# Patient Record
Sex: FEMALE | Race: BLACK OR AFRICAN AMERICAN | NOT HISPANIC OR LATINO | Employment: OTHER | URBAN - METROPOLITAN AREA
[De-identification: names, ages, dates, MRNs, and addresses within clinical notes are randomized per-mention and may not be internally consistent; named-entity substitution may affect disease eponyms.]

---

## 2017-01-05 ENCOUNTER — PATIENT OUTREACH (OUTPATIENT)
Dept: ADMINISTRATIVE | Facility: HOSPITAL | Age: 82
End: 2017-01-05

## 2017-01-05 NOTE — LETTER
January 5, 2017    Kiki Arechiga  [address] [place] [address]             [facility]  [address]  [facility] [address]  Phone: [phone] Dear MsAnnabella Arechiga:    [facility] is committed to your overall health.  To help you get the most out of each of your visits, we will review your information to make sure you are up to date on all of your recommended tests and/or procedures.      Sara Mcneal MD has found that you may be due for   Health Maintenance Due   Topic    Colonoscopy     Influenza Vaccine     Pneumococcal Vaccine    Lipid Panel    If you have had any of the above done at another facility, please bring the records or information with you so that your record at [facility] will be complete.    If you are currently taking medication, please bring it with you to your appointment for review.    We will be happy to assist you with scheduling any necessary appointments or you may contact the [facility] appointment desk at [phone] to schedule at your convenience.     Thank you for choosing [facility] for your healthcare needs,      Eliana PERALES LPN Care Coordinator  Care Coordination Department  [facility]

## 2017-01-09 RX ORDER — LEVOTHYROXINE SODIUM 50 UG/1
TABLET ORAL
Qty: 90 TABLET | Refills: 0 | Status: SHIPPED | OUTPATIENT
Start: 2017-01-09 | End: 2017-04-17 | Stop reason: SDUPTHER

## 2017-02-08 RX ORDER — TRIAMCINOLONE ACETONIDE 1 MG/G
CREAM TOPICAL 3 TIMES DAILY
Qty: 1 TUBE | Refills: 0 | Status: SHIPPED | OUTPATIENT
Start: 2017-02-08 | End: 2017-03-08 | Stop reason: SDUPTHER

## 2017-03-08 RX ORDER — TRIAMCINOLONE ACETONIDE 1 MG/G
CREAM TOPICAL
Qty: 15 G | Refills: 0 | Status: SHIPPED | OUTPATIENT
Start: 2017-03-08 | End: 2017-04-06

## 2017-04-06 ENCOUNTER — OFFICE VISIT (OUTPATIENT)
Dept: FAMILY MEDICINE | Facility: CLINIC | Age: 82
End: 2017-04-06
Payer: MEDICARE

## 2017-04-06 ENCOUNTER — LAB VISIT (OUTPATIENT)
Dept: LAB | Facility: HOSPITAL | Age: 82
End: 2017-04-06
Attending: FAMILY MEDICINE
Payer: MEDICARE

## 2017-04-06 ENCOUNTER — APPOINTMENT (OUTPATIENT)
Dept: CARDIOLOGY | Facility: CLINIC | Age: 82
End: 2017-04-06
Payer: MEDICARE

## 2017-04-06 VITALS
SYSTOLIC BLOOD PRESSURE: 118 MMHG | WEIGHT: 122.81 LBS | HEART RATE: 62 BPM | RESPIRATION RATE: 16 BRPM | DIASTOLIC BLOOD PRESSURE: 70 MMHG | BODY MASS INDEX: 23.59 KG/M2 | OXYGEN SATURATION: 99 % | TEMPERATURE: 97 F

## 2017-04-06 DIAGNOSIS — I10 ESSENTIAL HYPERTENSION: ICD-10-CM

## 2017-04-06 DIAGNOSIS — E03.9 ACQUIRED HYPOTHYROIDISM: ICD-10-CM

## 2017-04-06 DIAGNOSIS — B37.2 CANDIDAL INTERTRIGO: ICD-10-CM

## 2017-04-06 DIAGNOSIS — E78.2 MIXED HYPERLIPIDEMIA: ICD-10-CM

## 2017-04-06 DIAGNOSIS — I45.2 BIFASCICULAR BLOCK: ICD-10-CM

## 2017-04-06 DIAGNOSIS — F03.90 DEMENTIA WITHOUT BEHAVIORAL DISTURBANCE, UNSPECIFIED DEMENTIA TYPE: ICD-10-CM

## 2017-04-06 DIAGNOSIS — Z00.00 PREVENTATIVE HEALTH CARE: Primary | ICD-10-CM

## 2017-04-06 DIAGNOSIS — N18.30 CKD (CHRONIC KIDNEY DISEASE), STAGE III: ICD-10-CM

## 2017-04-06 DIAGNOSIS — R26.81 UNSTEADY GAIT: ICD-10-CM

## 2017-04-06 LAB
ALBUMIN SERPL BCP-MCNC: 3.8 G/DL
ALP SERPL-CCNC: 134 U/L
ALT SERPL W/O P-5'-P-CCNC: 25 U/L
ANION GAP SERPL CALC-SCNC: 13 MMOL/L
AST SERPL-CCNC: 32 U/L
BASOPHILS # BLD AUTO: 0.04 K/UL
BASOPHILS NFR BLD: 0.3 %
BILIRUB SERPL-MCNC: 0.6 MG/DL
BUN SERPL-MCNC: 19 MG/DL
CALCIUM SERPL-MCNC: 10.3 MG/DL
CHLORIDE SERPL-SCNC: 101 MMOL/L
CHOLEST/HDLC SERPL: 2.8 {RATIO}
CO2 SERPL-SCNC: 28 MMOL/L
CREAT SERPL-MCNC: 1.1 MG/DL
DIFFERENTIAL METHOD: ABNORMAL
EOSINOPHIL # BLD AUTO: 0.3 K/UL
EOSINOPHIL NFR BLD: 2.4 %
ERYTHROCYTE [DISTWIDTH] IN BLOOD BY AUTOMATED COUNT: 14.6 %
EST. GFR  (AFRICAN AMERICAN): 52.5 ML/MIN/1.73 M^2
EST. GFR  (NON AFRICAN AMERICAN): 45.6 ML/MIN/1.73 M^2
GLUCOSE SERPL-MCNC: 89 MG/DL
HCT VFR BLD AUTO: 39.5 %
HDL/CHOLESTEROL RATIO: 35.6 %
HDLC SERPL-MCNC: 135 MG/DL
HDLC SERPL-MCNC: 48 MG/DL
HGB BLD-MCNC: 13.4 G/DL
LDLC SERPL CALC-MCNC: 67.6 MG/DL
LYMPHOCYTES # BLD AUTO: 2 K/UL
LYMPHOCYTES NFR BLD: 17.2 %
MCH RBC QN AUTO: 29.1 PG
MCHC RBC AUTO-ENTMCNC: 33.9 %
MCV RBC AUTO: 86 FL
MONOCYTES # BLD AUTO: 0.8 K/UL
MONOCYTES NFR BLD: 6.6 %
NEUTROPHILS # BLD AUTO: 8.5 K/UL
NEUTROPHILS NFR BLD: 73.3 %
NONHDLC SERPL-MCNC: 87 MG/DL
PLATELET # BLD AUTO: 191 K/UL
PMV BLD AUTO: 12.9 FL
POTASSIUM SERPL-SCNC: 3.7 MMOL/L
PROT SERPL-MCNC: 7.8 G/DL
RBC # BLD AUTO: 4.61 M/UL
SODIUM SERPL-SCNC: 142 MMOL/L
TRIGL SERPL-MCNC: 97 MG/DL
TSH SERPL DL<=0.005 MIU/L-ACNC: 1.47 UIU/ML
WBC # BLD AUTO: 11.62 K/UL

## 2017-04-06 PROCEDURE — 99397 PER PM REEVAL EST PAT 65+ YR: CPT | Mod: 25,S$GLB,, | Performed by: FAMILY MEDICINE

## 2017-04-06 PROCEDURE — 93005 ELECTROCARDIOGRAM TRACING: CPT | Mod: S$GLB,,, | Performed by: FAMILY MEDICINE

## 2017-04-06 PROCEDURE — 36415 COLL VENOUS BLD VENIPUNCTURE: CPT | Mod: PO

## 2017-04-06 PROCEDURE — 80061 LIPID PANEL: CPT

## 2017-04-06 PROCEDURE — 84443 ASSAY THYROID STIM HORMONE: CPT

## 2017-04-06 PROCEDURE — 99999 PR PBB SHADOW E&M-EST. PATIENT-LVL III: CPT | Mod: PBBFAC,,, | Performed by: FAMILY MEDICINE

## 2017-04-06 PROCEDURE — 93010 ELECTROCARDIOGRAM REPORT: CPT | Mod: S$GLB,,, | Performed by: NUCLEAR MEDICINE

## 2017-04-06 PROCEDURE — 85025 COMPLETE CBC W/AUTO DIFF WBC: CPT

## 2017-04-06 PROCEDURE — 80053 COMPREHEN METABOLIC PANEL: CPT

## 2017-04-06 RX ORDER — CLOTRIMAZOLE AND BETAMETHASONE DIPROPIONATE 10; .64 MG/G; MG/G
CREAM TOPICAL 2 TIMES DAILY
Qty: 1 TUBE | Refills: 0 | Status: SHIPPED | OUTPATIENT
Start: 2017-04-06 | End: 2017-06-21 | Stop reason: SDUPTHER

## 2017-04-06 NOTE — MR AVS SNAPSHOT
[facility]  [address]  [place] [address]  Phone: [phone]                  Kiki Arechiga   2017 1:30 PM   Office Visit    Description:  Female : 1931   Provider:  Sara Mcneal MD   Department:  [facility]           Reason for Visit     Annual Exam     Foot Swelling           Diagnoses this Visit        Comments    Preventative health care    -  Primary     Acquired hypothyroidism         Dementia without behavioral disturbance, unspecified dementia type         Essential hypertension         Mixed hyperlipidemia         CKD (chronic kidney disease), stage III         Bifascicular block         Candidal intertrigo         Unsteady gait                To Do List           Future Appointments        Provider Department Dept Phone    2017 2:20 PM LABORATORY, [address] [facility] [phone]      Goals (5 Years of Data)     None      [facility]     [facility] Nurse Care Line -  Assistance  Unless otherwise directed by your provider, please contact [facility], our nurse care line that is available for  assistance.     Registered nurses in the [facility] provide: appointment scheduling, clinical advisement, health education, and other advisory services.  Call: [phone] (toll free)               Medications           Message regarding Medications     Verify the changes and/or additions to your medication regime listed below are the same as discussed with your clinician today.  If any of these changes or additions are incorrect, please notify your healthcare provider.        STOP taking these medications     meloxicam (MOBIC) 7.5 MG tablet Take 1 tablet (7.5 mg total) by mouth once daily.    oxybutynin (DITROPAN) 5 MG Tab Take 1 tablet (5 mg total) by mouth 3 (three) times daily as needed. For urinary frequency           Verify that the below list of medications is  an accurate representation of the medications you are currently taking.  If none reported, the list may be blank. If incorrect, please contact your healthcare provider. Carry this list with you in case of emergency.           Current Medications     atorvastatin (LIPITOR) 10 MG tablet take 1 tablet by mouth every evening    CALCIUM CARBONATE (CALCIUM 300 ORAL) Take by mouth. 1 Tablet By mouth Every day    donepezil (ARICEPT) 10 MG tablet take 1 tablet by mouth every evening    hydrochlorothiazide (HYDRODIURIL) 25 MG tablet take 1 tablet by mouth every morning    levothyroxine (SYNTHROID) 50 MCG tablet take 1 tablet by mouth once daily before BREAKFAST    triamcinolone acetonide 0.1% (KENALOG) 0.1 % cream apply to affected area three times a day           Clinical Reference Information           Your Vitals Were     BP Pulse Temp Resp Weight SpO2    118/70 62 96.5 °F (35.8 °C) (Tympanic) 16 55.7 kg (122 lb 12.7 oz) 99%    BMI                23.59 kg/m2          Blood Pressure          Most Recent Value    BP  118/70      Allergies as of 4/6/2017     No Known Allergies      Immunizations Administered on Date of Encounter - 4/6/2017     None      Orders Placed During Today's Visit      Normal Orders This Visit    Ambulatory Consult to Home Health     EKG 12-lead     Future Labs/Procedures Expected by Expires    CBC auto differential  4/6/2017 6/5/2018    Comprehensive metabolic panel  4/6/2017 6/5/2018    Lipid panel  4/6/2017 6/5/2018    TSH  4/6/2017 6/5/2018      Language Assistance Services     ATTENTION: Language assistance services are available, free of charge. Please call [phone].      ATENCIÓN: Si habla español, tiene a arceo disposición servicios gratuitos de asistencia lingüística. Llame al [phone].     CHÚ Ý: N?u b?n nói Ti?ng Vi?t, có các d?ch v? h? tr? ngôn ng? mi?n phí dành cho b?n. G?i s? [phone].         [facility] complies with applicable Federal civil rights laws  and does not discriminate on the basis of race, color, national origin, age, disability, or sex.

## 2017-04-06 NOTE — PROGRESS NOTES
CHIEF COMPLAINT: This is an 86 year old female here for preventive health exam.    SUBJECTIVE: Patient is brought in today by her sister and sister-in-law.  The patient lives at home with her daughter, who is wheelchair-bound due to his stroke.  Caregivers for the daughter usually provide meals.  The patient is not physically active.  She sits all day and does not get up and ambulate, although she has a walker at home.  She has very little social activity.   Patient has urinary incontinence.  She is able to dress herself with help and feeds herself at home.  Her family members are concerned about her and request home health.  Patient has an intermittent tremor in her hands.    Eye exam July 2014. Mammogram November 2016. Bone DEXA scan October 2015, due again in October 2017. Colonoscopy June 2009. TD booster November 2012. Pneumovax June 2005.  Prevnar October 2015.     ROS:  GENERAL: Patient denies fever, chills, night sweats. Patient denies weight gain. Patient denies anorexia, fatigue, weakness or swollen glands.  SKIN: Patient denies rash or hair loss.  HEENT: Patient denies sore throat, ear pain, hearing loss, nasal congestion, or runny nose. Patient denies visual disturbance, eye irritation or discharge.  LUNGS: Patient denies cough, wheeze or hemoptysis.  CARDIOVASCULAR: Patient denies chest pain, shortness of breath, palpitations, syncope or lower extremity edema.  GI: Patient denies abdominal pain, nausea, vomiting, diarrhea, constipation, blood in stool or melena.  GENITOURINARY: Patient denies pelvic pain, vaginal discharge, itch or odor. Patient denies irregular vaginal bleeding. Patient denies dysuria, frequency, hematuria, nocturia, urgency or incontinence.  BREASTS: Patient denies breast pain, mass or nipple discharge.  MUSCULOSKELETAL: Patient denies joint swelling, redness or warmth.  NEUROLOGIC: Patient denies headache, vertigo, paresthesias, weakness in limb, dysarthria, dysphagia or abnormality  of gait.  PSYCHIATRIC: Patient denies anxiety, depression, or memory loss.     OBJECTIVE:   GENERAL: Well-developed well-nourished, elderly, black female alert and oriented x3, in no acute distress. Mild to moderate cognitive impairment. Weight loss of 5 pounds in the last year.  SKIN: Confluent, maculopapular eruption underneath breasts.   HEENT: Eyes: Clear conjunctivae. No scleral icterus. Pupils equal reactive to light and accommodation. Ears: Clear TMs. Clear canals. Nose: Without congestion. Pharynx: Without injection or exudates.   NECK: Supple, normal range of motion. No masses, lymphadenopathy or enlarged thyroid. No JVD. Carotids 2+ and equal. No bruits.   LUNGS: Clear to auscultation. Normal respiratory effort.   CARDIOVASCULAR: Regular rhythm with frequent irregular beats, normal S1, S2 without murmur, gallop or rub.   BACK: No CVA or spinal tenderness.   BREASTS: No masses, tenderness or nipple discharge.   ABDOMEN: Normal appearance. Active bowel sounds. Soft, nontender without mass or organomegaly. No rebound or guarding.   EXTREMITIES: Without cyanosis, clubbing or edema. Distal pulses 2+ and equal. Normal range of motion in all extremities. No joint effusion, erythema or warmth.   NEUROLOGIC: Cranial nerves II through XII without deficit. Motor strength equal bilaterally. Sensation normal to touch. Deep tendon reflexes 2+ and equal. Gait unsteady without support.  Mild intention tremor. Negative cerebellar signs.   PELVIC: External: Without lesions or inflammation. Vaginal: Atrophic changes, cuff intact. Bimanual: Non-tender, without masses. Rectovaginal: Confirms, heme-negative stool x2.    EKG: Normal sinus rhythm.  Right bundle félix block.  Left anterior fascicular block.    ASSESSMENT:  1. Preventative health care    2. Acquired hypothyroidism    3. Dementia without behavioral disturbance, unspecified dementia type    4. Essential hypertension    5. Mixed hyperlipidemia    6. CKD (chronic  kidney disease), stage III    7. Bifascicular block    8. Candidal intertrigo    9. Unsteady gait      PLAN:   1.  Exercise regularly.  2.  Age-appropriate counseling.  3.  Fasting lab.  4.  Lotrisone cream twice daily to rash.  5.  Home health consult.  6.  Needs bone DEXA scan in October 2017.

## 2017-04-13 ENCOUNTER — TELEPHONE (OUTPATIENT)
Dept: FAMILY MEDICINE | Facility: CLINIC | Age: 82
End: 2017-04-13

## 2017-04-13 NOTE — TELEPHONE ENCOUNTER
----- Message from Susannah Alvarez sent at 4/13/2017 11:57 AM CDT -----  Please call [facility] at [phone] regarding height and weight.

## 2017-04-17 RX ORDER — TRIAMCINOLONE ACETONIDE 1 MG/G
CREAM TOPICAL
Qty: 15 G | Refills: 3 | Status: SHIPPED | OUTPATIENT
Start: 2017-04-17

## 2017-04-17 RX ORDER — LEVOTHYROXINE SODIUM 50 UG/1
50 TABLET ORAL DAILY
Qty: 90 TABLET | Refills: 3 | Status: SHIPPED | OUTPATIENT
Start: 2017-04-17 | End: 2018-04-06 | Stop reason: SDUPTHER

## 2017-04-18 ENCOUNTER — HOSPITAL ENCOUNTER (EMERGENCY)
Facility: HOSPITAL | Age: 82
Discharge: HOME | End: 2017-04-18
Payer: MEDICARE

## 2017-04-18 VITALS
SYSTOLIC BLOOD PRESSURE: 120 MMHG | HEART RATE: 70 BPM | WEIGHT: 122 LBS | RESPIRATION RATE: 16 BRPM | BODY MASS INDEX: 23.03 KG/M2 | DIASTOLIC BLOOD PRESSURE: 82 MMHG | OXYGEN SATURATION: 99 % | HEIGHT: 61 IN | TEMPERATURE: 99 F

## 2017-04-18 DIAGNOSIS — R63.0 DECREASED APPETITE: ICD-10-CM

## 2017-04-18 DIAGNOSIS — E87.6 HYPOKALEMIA: Primary | ICD-10-CM

## 2017-04-18 DIAGNOSIS — Z74.09 DECREASED AMBULATION STATUS: ICD-10-CM

## 2017-04-18 DIAGNOSIS — N39.0 URINARY TRACT INFECTION WITHOUT HEMATURIA, SITE UNSPECIFIED: ICD-10-CM

## 2017-04-18 DIAGNOSIS — G47.10 INCREASED SLEEPING: ICD-10-CM

## 2017-04-18 LAB
ANION GAP SERPL CALC-SCNC: 12 MMOL/L
BACTERIA #/AREA URNS HPF: ABNORMAL /HPF
BASOPHILS # BLD AUTO: 0.03 K/UL
BASOPHILS NFR BLD: 0.4 %
BILIRUB UR QL STRIP: NEGATIVE
BUN SERPL-MCNC: 15 MG/DL
CALCIUM SERPL-MCNC: 9.1 MG/DL
CHLORIDE SERPL-SCNC: 103 MMOL/L
CLARITY UR: CLEAR
CO2 SERPL-SCNC: 28 MMOL/L
COLOR UR: YELLOW
CREAT SERPL-MCNC: 1 MG/DL
DIFFERENTIAL METHOD: ABNORMAL
EOSINOPHIL # BLD AUTO: 0 K/UL
EOSINOPHIL NFR BLD: 0.4 %
ERYTHROCYTE [DISTWIDTH] IN BLOOD BY AUTOMATED COUNT: 15.1 %
EST. GFR  (AFRICAN AMERICAN): 59 ML/MIN/1.73 M^2
EST. GFR  (NON AFRICAN AMERICAN): 51 ML/MIN/1.73 M^2
GLUCOSE SERPL-MCNC: 93 MG/DL
GLUCOSE UR QL STRIP: NEGATIVE
HCT VFR BLD AUTO: 36.9 %
HGB BLD-MCNC: 12.5 G/DL
HGB UR QL STRIP: NEGATIVE
KETONES UR QL STRIP: NEGATIVE
LEUKOCYTE ESTERASE UR QL STRIP: NEGATIVE
LYMPHOCYTES # BLD AUTO: 1.3 K/UL
LYMPHOCYTES NFR BLD: 15.9 %
MCH RBC QN AUTO: 29.3 PG
MCHC RBC AUTO-ENTMCNC: 33.9 %
MCV RBC AUTO: 87 FL
MICROSCOPIC COMMENT: ABNORMAL
MONOCYTES # BLD AUTO: 1 K/UL
MONOCYTES NFR BLD: 11.8 %
NEUTROPHILS # BLD AUTO: 5.9 K/UL
NEUTROPHILS NFR BLD: 71.5 %
NITRITE UR QL STRIP: POSITIVE
PH UR STRIP: 6 [PH] (ref 5–8)
PLATELET # BLD AUTO: 138 K/UL
PMV BLD AUTO: 11.7 FL
POTASSIUM SERPL-SCNC: 3.2 MMOL/L
PROT UR QL STRIP: NEGATIVE
RBC # BLD AUTO: 4.26 M/UL
SODIUM SERPL-SCNC: 143 MMOL/L
SP GR UR STRIP: 1.02 (ref 1–1.03)
URN SPEC COLLECT METH UR: ABNORMAL
UROBILINOGEN UR STRIP-ACNC: 1 EU/DL
WBC # BLD AUTO: 8.17 K/UL
WBC #/AREA URNS HPF: 5 /HPF (ref 0–5)

## 2017-04-18 PROCEDURE — 25000003 PHARM REV CODE 250: Performed by: PHYSICIAN ASSISTANT

## 2017-04-18 PROCEDURE — 81000 URINALYSIS NONAUTO W/SCOPE: CPT

## 2017-04-18 PROCEDURE — 87086 URINE CULTURE/COLONY COUNT: CPT

## 2017-04-18 PROCEDURE — 87077 CULTURE AEROBIC IDENTIFY: CPT

## 2017-04-18 PROCEDURE — 80048 BASIC METABOLIC PNL TOTAL CA: CPT

## 2017-04-18 PROCEDURE — 85025 COMPLETE CBC W/AUTO DIFF WBC: CPT

## 2017-04-18 PROCEDURE — 99284 EMERGENCY DEPT VISIT MOD MDM: CPT

## 2017-04-18 PROCEDURE — 87186 SC STD MICRODIL/AGAR DIL: CPT

## 2017-04-18 PROCEDURE — 87088 URINE BACTERIA CULTURE: CPT

## 2017-04-18 RX ORDER — CEPHALEXIN 500 MG/1
500 CAPSULE ORAL EVERY 8 HOURS
Qty: 21 CAPSULE | Refills: 0 | Status: SHIPPED | OUTPATIENT
Start: 2017-04-18 | End: 2017-04-25

## 2017-04-18 RX ORDER — POTASSIUM CHLORIDE 750 MG/1
10 TABLET, EXTENDED RELEASE ORAL
Status: COMPLETED | OUTPATIENT
Start: 2017-04-18 | End: 2017-04-18

## 2017-04-18 RX ADMIN — POTASSIUM CHLORIDE 10 MEQ: 750 TABLET, EXTENDED RELEASE ORAL at 03:04

## 2017-04-18 NOTE — ED PROVIDER NOTES
"Encounter Date: 2017       History     Chief Complaint   Patient presents with    Fatigue     " sleeping alot more than normal"     Review of patient's allergies indicates:  No Known Allergies  HPI Comments: The history is provided by the patient's niece and her caretaker. The patient lives in a private residence with her daughter. She has sitter and home health nurses. Her family was concerned because over the past week the patient has been sleeping longer and more often than usual. They also think she seems to be eating less of her meals. They note that she is walking less frequently also. Her caretaker states that the home health nurse suspects that she has a UTI because her urine was dark yellow this morning and the last time she had a UTI she had similar symptoms. No fever reported. No pre-arrival treatment. No additional symptoms.     The patient states that she feels just fine when asked. The patient has a history of dementia.      Past Medical History:   Diagnosis Date    Cataract     CVA (cerebral infarction)     Dementia     Hyperlipidemia     Hypertension     Hypothyroidism     Osteoarthritis, knee     Osteopenia     Stage 3 chronic kidney disease 2017     Past Surgical History:   Procedure Laterality Date    BREAST BIOPSY       SECTION, CLASSIC      x3    HYSTERECTOMY       Family History   Problem Relation Age of Onset    Stroke Father     Leukemia Sister     Diabetes Brother     Diabetes Brother     Hypertension Other      extended family    Other Mother       age 102     Social History   Substance Use Topics    Smoking status: Never Smoker    Smokeless tobacco: None    Alcohol use No     Review of Systems   Constitutional: Positive for activity change and appetite change. Negative for diaphoresis and fever.   HENT: Negative for congestion, drooling, ear discharge, facial swelling, rhinorrhea, trouble swallowing and voice change.    Eyes: Negative for " discharge and redness.   Respiratory: Negative for apnea, cough, wheezing and stridor.    Cardiovascular: Negative for leg swelling.   Gastrointestinal: Negative for abdominal distention, blood in stool, constipation, diarrhea and vomiting.   Genitourinary: Negative for decreased urine volume, difficulty urinating and dysuria.   Musculoskeletal: Negative for gait problem, joint swelling and neck stiffness.   Skin: Negative for color change, rash and wound.   Neurological: Negative for seizures, facial asymmetry and weakness.   Psychiatric/Behavioral: Negative for behavioral problems.       Physical Exam   Initial Vitals   BP Pulse Resp Temp SpO2   04/18/17 1252 04/18/17 1252 04/18/17 1252 04/18/17 1252 04/18/17 1252   96/60 80 15 98.7 °F (37.1 °C) 100 %     Physical Exam    Nursing note and vitals reviewed.  Constitutional: She appears well-nourished. She is not diaphoretic. No distress.   Smiling and pleasant to speak with. Interactive. No lethargy.    HENT:   Head: Atraumatic.   Mouth/Throat: Oropharynx is clear and moist.   Eyes: Conjunctivae are normal.   Neck:   Non-tender. No adenopathy. Normal ROM.    Cardiovascular: Normal rate and intact distal pulses.   Pulmonary/Chest: No respiratory distress. She has no wheezes. She has no rhonchi. She has no rales.   No conversational dyspnea. No cough.    Abdominal: Soft. She exhibits no distension. There is no tenderness. There is no rebound and no guarding.   Benign abdomen.    Musculoskeletal: Normal range of motion. She exhibits no tenderness.   Neurological: She is alert.   She is oriented to person but not place. She has a mild weakness on her left side - baseline per family.    Skin: Skin is warm. No rash noted.   No wound. No swelling. No traumatic marks.    Psychiatric: She has a normal mood and affect. Her behavior is normal.         ED Course   Procedures  Labs Reviewed   CBC W/ AUTO DIFFERENTIAL - Abnormal; Notable for the following:        Result Value     Hematocrit 36.9 (*)     RDW 15.1 (*)     Platelets 138 (*)     Lymph% 15.9 (*)     All other components within normal limits   BASIC METABOLIC PANEL - Abnormal; Notable for the following:     Potassium 3.2 (*)     eGFR if  59 (*)     eGFR if non  51 (*)     All other components within normal limits   URINALYSIS - Abnormal; Notable for the following:     Nitrite, UA Positive (*)     All other components within normal limits   URINALYSIS MICROSCOPIC - Abnormal; Notable for the following:     Bacteria, UA Few (*)     All other components within normal limits   CULTURE, URINE   CULTURE, URINE     Results for orders placed or performed during the hospital encounter of 04/18/17   CBC auto differential   Result Value Ref Range    WBC 8.17 3.90 - 12.70 K/uL    RBC 4.26 4.00 - 5.40 M/uL    Hemoglobin 12.5 12.0 - 16.0 g/dL    Hematocrit 36.9 (L) 37.0 - 48.5 %    MCV 87 82 - 98 fL    MCH 29.3 27.0 - 31.0 pg    MCHC 33.9 32.0 - 36.0 %    RDW 15.1 (H) 11.5 - 14.5 %    Platelets 138 (L) 150 - 350 K/uL    MPV 11.7 9.2 - 12.9 fL    Gran # 5.9 1.8 - 7.7 K/uL    Lymph # 1.3 1.0 - 4.8 K/uL    Mono # 1.0 0.3 - 1.0 K/uL    Eos # 0.0 0.0 - 0.5 K/uL    Baso # 0.03 0.00 - 0.20 K/uL    Gran% 71.5 38.0 - 73.0 %    Lymph% 15.9 (L) 18.0 - 48.0 %    Mono% 11.8 4.0 - 15.0 %    Eosinophil% 0.4 0.0 - 8.0 %    Basophil% 0.4 0.0 - 1.9 %    Differential Method Automated    Basic metabolic panel   Result Value Ref Range    Sodium 143 136 - 145 mmol/L    Potassium 3.2 (L) 3.5 - 5.1 mmol/L    Chloride 103 95 - 110 mmol/L    CO2 28 23 - 29 mmol/L    Glucose 93 70 - 110 mg/dL    BUN, Bld 15 8 - 23 mg/dL    Creatinine 1.0 0.5 - 1.4 mg/dL    Calcium 9.1 8.7 - 10.5 mg/dL    Anion Gap 12 8 - 16 mmol/L    eGFR if African American 59 (A) >60 mL/min/1.73 m^2    eGFR if non African American 51 (A) >60 mL/min/1.73 m^2   Urinalysis   Result Value Ref Range    Specimen UA Urine, Catheterized     Color, UA Yellow Yellow, Straw, Nita     Appearance, UA Clear Clear    pH, UA 6.0 5.0 - 8.0    Specific Gravity, UA 1.025 1.005 - 1.030    Protein, UA Negative Negative    Glucose, UA Negative Negative    Ketones, UA Negative Negative    Bilirubin (UA) Negative Negative    Occult Blood UA Negative Negative    Nitrite, UA Positive (A) Negative    Urobilinogen, UA 1.0 <2.0 EU/dL    Leukocytes, UA Negative Negative   Urinalysis Microscopic   Result Value Ref Range    WBC, UA 5 0 - 5 /hpf    Bacteria, UA Few (A) None-Occ /hpf    Microscopic Comment SEE COMMENT                Medical Decision Making:   History:   Old Medical Records: I decided to obtain old medical records.  Initial Assessment:   Reports of elderly patient with dementia having decreased appetite, decreased ambulation, and increased naps over the past week. Home health RN suspects UTI due to similar episode in the past and dark colored urine this am.   Clinical Tests:   Lab Tests: Ordered and Reviewed  ED Management:  Cath specimen UA does show nitrite positive UTI - culture ordered - Keflex prescribed     Mild Hypokalemia - PO supplement given in the ER     Gentle IV fluid bolus given in the ER     I advised close follow up with primary care. I instructed prompt return to the ER if worse in any way.                     ED Course     Clinical Impression:   The primary encounter diagnosis was Hypokalemia. Diagnoses of Urinary tract infection without hematuria, site unspecified, Decreased appetite, Decreased ambulation status, and Increased sleeping were also pertinent to this visit.    Disposition:   Disposition: Discharged  Condition: Stable       Eleazar Lowery PA-C  04/18/17 [id number]

## 2017-04-18 NOTE — DISCHARGE INSTRUCTIONS
"  Bladder Infection, Female (Adult)    Urine is normally doesn't have any bacteria in it. But bacteria can get into the urinary tract from the skin around the rectum. Or they can travel in the blood from elsewhere in the body. Once they are in your urinary tract, they can cause infection in the urethra (urethritis), the bladder (cystitis), or the kidneys (pyelonephritis).  The most common place for an infection is in the bladder. This is called a bladder infection. This is one of the most common infections in women. Most bladder infections are easily treated. They are not serious unless the infection spreads to the kidney.  The phrases "bladder infection," "UTI," and "cystitis" are often used to describe the same thing. But they are not always the same. Cystitis is an inflammation of the bladder. The most common cause of cystitis is an infection.  Symptoms  The infection causes inflammation in the urethra and bladder. This causes many of the symptoms. The most common symptoms of a bladder infection are:  · Pain or burning when urinating  · Having to urinate more often than usual  · Urgent need to urinate  · Only a small amount of urine comes out  · Blood in urine  · Abdominal discomfort. This is usually in the lower abdomen above the pubic bone.  · Cloudy urine  · Strong- or bad-smelling urine  · Unable to urinate (urinary retention)  · Unable to hold urine in (urinary incontinence)  · Fever  · Loss of appetite  · Confusion (in older adults)  Causes  Bladder infections are not contagious. You can't get one from someone else, from a toilet seat, or from sharing a bath.  The most common cause of bladder infections is bacteria from the bowels. The bacteria get onto the skin around the opening of the urethra. From there, they can get into the urine and travel up to the bladder, causing inflammation and infection. This usually happens because of:  · Wiping improperly after urinating. Always wipe from front to " back.  · Bowel incontinence  · Pregnancy  · Procedures such as having a catheter inserted  · Older age  · Not emptying your bladder. This can allow bacteria a chance to grow in your urine.  · Dehydration  · Constipation  · Sex  · Use of a diaphragm for birth control   Treatment  Bladder infections are diagnosed by a urine test. They are treated with antibiotics and usually clear up quickly without complications. Treatment helps prevent a more serious kidney infection.  Medicines  Medicines can help in the treatment of a bladder infection:  · Take antibiotics until they are used up, even if you feel better. It is important to finish them to make sure the infection has cleared.  · You can use acetaminophen or ibuprofen for pain, fever, or discomfort, unless another medicine was prescribed. If you have chronic liver or kidney disease, talk with your healthcare provider before using these medicines. Also talk with your provider if you've ever had a stomach ulcer or gastrointestinal bleeding, or are taking blood-thinner medicines.  · If you are given phenazopydridine to reduce burning with urination, it will cause your urine to become a bright orange color. This can stain clothing.  Care and prevention  These self-care steps can help prevent future infections:  · Drink plenty of fluids to prevent dehydration and flush out your bladder. Do this unless you must restrict fluids for other health reasons, or your doctor told you not to.  · Proper cleaning after going to the bathroom is important. Wipe from front to back after using the toilet to prevent the spread of bacteria.  · Urinate more often. Don't try to hold urine in for a long time.  · Wear loose-fitting clothes and cotton underwear. Avoid tight-fitting pants.  · Improve your diet and prevent constipation. Eat more fresh fruit and vegetables, and fiber, and less junk and fatty foods.  · Avoid sex until your symptoms are gone.  · Avoid caffeine, alcohol, and spicy  foods. These can irritate your bladder.  · Urinate right after intercourse to flush out your bladder.  · If you use birth control pills and have frequent bladder infections, discuss it with your doctor.  Follow-up care  Call your healthcare provider if all symptoms are not gone after 3 days of treatment. This is especially important if you have repeat infections.  If a culture was done, you will be told if your treatment needs to be changed. If directed, you can call to find out the results.  If X-rays were done, you will be told if the results will affect your treatment.  Call 911  Call 911 if any of the following occur:  · Trouble breathing  · Hard to wake up or confusion  · Fainting or loss of consciousness  · Rapid heart rate  When to seek medical advice  Call your healthcare provider right away if any of these occur:  · Fever of 100.4ºF (38.0ºC) or higher, or as directed by your healthcare provider  · Symptoms are not better by the third day of treatment  · Back or belly (abdominal) pain that gets worse  · Repeated vomiting, or unable to keep medicine down  · Weakness or dizziness  · Vaginal discharge  · Pain, redness, or swelling in the outer vaginal area (labia)  Date Last Reviewed: 10/1/2016  © 8789-0356 [employer]. [address] [place] [address], [place] [address]. All rights reserved. This information is not intended as a substitute for professional medical care. Always follow your healthcare professional's instructions.          Discharge Instructions for Hypokalemia  You have been diagnosed with hypokalemia. This means you have a low level of potassium in your blood. Potassium helps your nerve and muscle cells work as they should. These cells include the cells in your heart. A low level of potassium in the blood can cause serious problems, such as abnormal heart rhythms and even heart attack.  Diet changes  Eat more potassium-rich foods:  · Bananas  · Oranges and orange juice  · Tomatoes, tomato  sauce, and tomato juice  · Leafy green vegetables, such as spinach, kale, salad greens, collards, and chard  · Melons (all kinds)  · Pomegranates  · Peas  · Beans  · Potatoes  · Sweet potatoes  · Avocados, including guacamole  · Vegetable juices, such as V8  · Fruit juices  · All nuts and seeds  · Fish, including tuna, halibut, salmon, cod, snapper, luis a, swordfish, and perch  · Milk, including fat-free, low-fat, whole, chocolate, and buttermilk  · Soy milk  Other home care  · Take a potassium supplement as directed by your healthcare provider.  · After strenuous exercise or any activity that causes you to sweat a lot, grab a beverage high in potassium. This includes chocolate milk, coconut water, orange juice, or low-sodium vegetable juices.  · Be sure to eat foods or drink fluids that contain potassium if you are having diarrhea or vomiting.  · Have your potassium levels checked regularly.  · Take all medicines exactly as directed.  · Tell your healthcare provider about all prescription and ove-the-counter medicines you are taking. This includes herbal products.  · Avoid foods that are high in salt. Avoid canned and prepared foods that are high in salt.  Follow-up  · Make a follow-up appointment as directed by our staff.  · Keep all follow-up appointments. Your healthcare provider needs to monitor your condition closely.     When to call your healthcare provider  Call your provider right away or go to the emergency room if you have any of the following:  · Vomiting  · Fatigue  · Diarrhea  · Rapid, irregular heartbeat  · Shortness of breath  · Chest pain  · Muscle cramps, spasms, or twitching  · Weakness  · Paralysis   Date Last Reviewed: 6/20/2015  © 6740-9341 [employer]. [address] [place] [address], [place] [address]. All rights reserved. This information is not intended as a substitute for professional medical care. Always follow your healthcare professional's instructions.

## 2017-04-18 NOTE — ED AVS SNAPSHOT
[facility] [address] [place] [address]               Kota Arechiga   2017 12:48 PM   ED    Description:  Female : 1931   Department:  [facility]           Your Care was Coordinated By:     Provider Role From To    Shiva Lowery PA-C Physician Assistant 17 1301 --      Reason for Visit     Fatigue           Diagnoses this Visit        Comments    Hypokalemia    -  Primary     Urinary tract infection without hematuria, site unspecified           ED Disposition     ED Disposition Condition Comment    Discharge             To Do List           Follow-up Information     Follow up with Sara Mcneal MD. Schedule an appointment as soon as possible for a visit in 2 days.    Specialty:  Family Medicine    Why:  Follow up with your primary care physician in the next 1-2 days for re-evaluation and further management.     Contact information:    [address]  [facility] [id number] [phone]          Follow up with [facility].    Specialty:  Emergency Medicine    Why:  If symptoms worsen in any way.     Contact information:    [address] [place] [id number] [phone]       These Medications        Disp Refills Start End    cephALEXin (KEFLEX) 500 MG capsule 21 capsule 0 2017    Take 1 capsule (500 mg total) by mouth every 8 (eight) hours. - Oral      [facility]     [facility] Nurse Care Line - 24/7 Assistance  Unless otherwise directed by your provider, please contact [facility], our nurse care line that is available for 24/7 assistance.     Registered nurses in the [facility] provide: appointment scheduling, clinical advisement, health education, and other advisory services.  Call: [phone] (toll free)               Medications           Message regarding Medications     Verify the changes and/or additions to  your medication regime listed below are the same as discussed with your clinician today.  If any of these changes or additions are incorrect, please notify your healthcare provider.        START taking these NEW medications        Refills    cephALEXin (KEFLEX) 500 MG capsule 0    Sig: Take 1 capsule (500 mg total) by mouth every 8 (eight) hours.    Class: Print    Route: Oral      These medications were administered today        Dose Freq    sodium chloride 0.9% bolus 400 mL 400 mL ED 1 Time    Sig: Inject 400 mLs into the vein ED 1 Time.    Class: Normal    Route: Intravenous    Cosign for Ordering: Accepted by Bernardino Sims MD on 4/18/2017  1:51 PM    potassium chloride SA CR tablet 10 mEq 10 mEq ED 1 Time    Sig: Take 1 tablet (10 mEq total) by mouth ED 1 Time.    Class: Normal    Route: Oral    Cosign for Ordering: Required by Bernardino Sims MD           Verify that the below list of medications is an accurate representation of the medications you are currently taking.  If none reported, the list may be blank. If incorrect, please contact your healthcare provider. Carry this list with you in case of emergency.           Current Medications     atorvastatin (LIPITOR) 10 MG tablet take 1 tablet by mouth every evening    CALCIUM CARBONATE (CALCIUM 300 ORAL) Take by mouth. 1 Tablet By mouth Every day    clotrimazole-betamethasone 1-0.05% (LOTRISONE) cream Apply topically 2 (two) times daily.    donepezil (ARICEPT) 10 MG tablet take 1 tablet by mouth every evening    hydrochlorothiazide (HYDRODIURIL) 25 MG tablet take 1 tablet by mouth every morning    levothyroxine (SYNTHROID) 50 MCG tablet Take 1 tablet (50 mcg total) by mouth once daily.    cephALEXin (KEFLEX) 500 MG capsule Take 1 capsule (500 mg total) by mouth every 8 (eight) hours.    sodium chloride 0.9% bolus 400 mL Inject 400 mLs into the vein ED 1 Time.    triamcinolone acetonide 0.1% (KENALOG) 0.1 % cream apply to affected area three times a day  "          Clinical Reference Information           Your Vitals Were     BP Pulse Temp Resp Height Weight    118/72 70 98.7 °F (37.1 °C) (Oral) 16 5' 0.5" (1.537 m) 55.3 kg (122 lb)    SpO2 BMI             96% 23.43 kg/m2         Allergies as of 4/18/2017     No Known Allergies      Immunizations Administered on Date of Encounter - 4/18/2017     None      ED Micro, Lab, POCT     Start Ordered       Status Ordering Provider    04/18/17 1650 04/18/17 1651  Urine culture **CANNOT BE ORDERED STAT**  Add-on      Completed     04/18/17 1315 04/18/17 1315  Urinalysis Microscopic  Once      Final result     04/18/17 1315 04/18/17 1315  Urine culture  Once      In process     04/18/17 1314 04/18/17 1315  CBC auto differential  STAT      Final result     04/18/17 1314 04/18/17 1315  Basic metabolic panel  STAT      Final result     04/18/17 1314 04/18/17 1315  Urinalysis  STAT      Final result       ED Imaging Orders     None        Discharge Instructions         Bladder Infection, Female (Adult)    Urine is normally doesn't have any bacteria in it. But bacteria can get into the urinary tract from the skin around the rectum. Or they can travel in the blood from elsewhere in the body. Once they are in your urinary tract, they can cause infection in the urethra (urethritis), the bladder (cystitis), or the kidneys (pyelonephritis).  The most common place for an infection is in the bladder. This is called a bladder infection. This is one of the most common infections in women. Most bladder infections are easily treated. They are not serious unless the infection spreads to the kidney.  The phrases "bladder infection," "UTI," and "cystitis" are often used to describe the same thing. But they are not always the same. Cystitis is an inflammation of the bladder. The most common cause of cystitis is an infection.  Symptoms  The infection causes inflammation in the urethra and bladder. This causes many of the symptoms. The most common " symptoms of a bladder infection are:  · Pain or burning when urinating  · Having to urinate more often than usual  · Urgent need to urinate  · Only a small amount of urine comes out  · Blood in urine  · Abdominal discomfort. This is usually in the lower abdomen above the pubic bone.  · Cloudy urine  · Strong- or bad-smelling urine  · Unable to urinate (urinary retention)  · Unable to hold urine in (urinary incontinence)  · Fever  · Loss of appetite  · Confusion (in older adults)  Causes  Bladder infections are not contagious. You can't get one from someone else, from a toilet seat, or from sharing a bath.  The most common cause of bladder infections is bacteria from the bowels. The bacteria get onto the skin around the opening of the urethra. From there, they can get into the urine and travel up to the bladder, causing inflammation and infection. This usually happens because of:  · Wiping improperly after urinating. Always wipe from front to back.  · Bowel incontinence  · Pregnancy  · Procedures such as having a catheter inserted  · Older age  · Not emptying your bladder. This can allow bacteria a chance to grow in your urine.  · Dehydration  · Constipation  · Sex  · Use of a diaphragm for birth control   Treatment  Bladder infections are diagnosed by a urine test. They are treated with antibiotics and usually clear up quickly without complications. Treatment helps prevent a more serious kidney infection.  Medicines  Medicines can help in the treatment of a bladder infection:  · Take antibiotics until they are used up, even if you feel better. It is important to finish them to make sure the infection has cleared.  · You can use acetaminophen or ibuprofen for pain, fever, or discomfort, unless another medicine was prescribed. If you have chronic liver or kidney disease, talk with your healthcare provider before using these medicines. Also talk with your provider if you've ever had a stomach ulcer or gastrointestinal  bleeding, or are taking blood-thinner medicines.  · If you are given phenazopydridine to reduce burning with urination, it will cause your urine to become a bright orange color. This can stain clothing.  Care and prevention  These self-care steps can help prevent future infections:  · Drink plenty of fluids to prevent dehydration and flush out your bladder. Do this unless you must restrict fluids for other health reasons, or your doctor told you not to.  · Proper cleaning after going to the bathroom is important. Wipe from front to back after using the toilet to prevent the spread of bacteria.  · Urinate more often. Don't try to hold urine in for a long time.  · Wear loose-fitting clothes and cotton underwear. Avoid tight-fitting pants.  · Improve your diet and prevent constipation. Eat more fresh fruit and vegetables, and fiber, and less junk and fatty foods.  · Avoid sex until your symptoms are gone.  · Avoid caffeine, alcohol, and spicy foods. These can irritate your bladder.  · Urinate right after intercourse to flush out your bladder.  · If you use birth control pills and have frequent bladder infections, discuss it with your doctor.  Follow-up care  Call your healthcare provider if all symptoms are not gone after 3 days of treatment. This is especially important if you have repeat infections.  If a culture was done, you will be told if your treatment needs to be changed. If directed, you can call to find out the results.  If X-rays were done, you will be told if the results will affect your treatment.  Call 911  Call 911 if any of the following occur:  · Trouble breathing  · Hard to wake up or confusion  · Fainting or loss of consciousness  · Rapid heart rate  When to seek medical advice  Call your healthcare provider right away if any of these occur:  · Fever of 100.4ºF (38.0ºC) or higher, or as directed by your healthcare provider  · Symptoms are not better by the third day of treatment  · Back or belly  (abdominal) pain that gets worse  · Repeated vomiting, or unable to keep medicine down  · Weakness or dizziness  · Vaginal discharge  · Pain, redness, or swelling in the outer vaginal area (labia)  Date Last Reviewed: 10/1/2016  © 9344-9340 [employer]. [address] [place] [address]. All rights reserved. This information is not intended as a substitute for professional medical care. Always follow your healthcare professional's instructions.          Discharge Instructions for Hypokalemia  You have been diagnosed with hypokalemia. This means you have a low level of potassium in your blood. Potassium helps your nerve and muscle cells work as they should. These cells include the cells in your heart. A low level of potassium in the blood can cause serious problems, such as abnormal heart rhythms and even heart attack.  Diet changes  Eat more potassium-rich foods:  · Bananas  · Oranges and orange juice  · Tomatoes, tomato sauce, and tomato juice  · Leafy green vegetables, such as spinach, kale, salad greens, collards, and chard  · Melons (all kinds)  · Pomegranates  · Peas  · Beans  · Potatoes  · Sweet potatoes  · Avocados, including guacamole  · Vegetable juices, such as V8  · Fruit juices  · All nuts and seeds  · Fish, including tuna, halibut, salmon, cod, snapper, luis a, swordfish, and perch  · Milk, including fat-free, low-fat, whole, chocolate, and buttermilk  · Soy milk  Other home care  · Take a potassium supplement as directed by your healthcare provider.  · After strenuous exercise or any activity that causes you to sweat a lot, grab a beverage high in potassium. This includes chocolate milk, coconut water, orange juice, or low-sodium vegetable juices.  · Be sure to eat foods or drink fluids that contain potassium if you are having diarrhea or vomiting.  · Have your potassium levels checked regularly.  · Take all medicines exactly as directed.  · Tell your healthcare provider about  all prescription and ove-the-counter medicines you are taking. This includes herbal products.  · Avoid foods that are high in salt. Avoid canned and prepared foods that are high in salt.  Follow-up  · Make a follow-up appointment as directed by our staff.  · Keep all follow-up appointments. Your healthcare provider needs to monitor your condition closely.     When to call your healthcare provider  Call your provider right away or go to the emergency room if you have any of the following:  · Vomiting  · Fatigue  · Diarrhea  · Rapid, irregular heartbeat  · Shortness of breath  · Chest pain  · Muscle cramps, spasms, or twitching  · Weakness  · Paralysis   Date Last Reviewed: 6/20/2015  © 6460-4665 [employer]. [address] [place] [address], [place] [address]. All rights reserved. This information is not intended as a substitute for professional medical care. Always follow your healthcare professional's instructions.           [facility] complies with applicable Federal civil rights laws and does not discriminate on the basis of race, color, national origin, age, disability, or sex.        Language Assistance Services     ATTENTION: Language assistance services are available, free of charge. Please call [phone].      ATENCIÓN: Si habla español, tiene a arceo disposición servicios gratuitos de asistencia lingüística. Llame al [phone].     CHÚ Ý: N?u b?n nói Ti?ng Vi?t, có các d?ch v? h? tr? ngôn ng? mi?n phí dành cho b?n. G?i s? [phone].

## 2017-04-21 LAB — BACTERIA UR CULT: NORMAL

## 2017-06-21 RX ORDER — CLOTRIMAZOLE AND BETAMETHASONE DIPROPIONATE 10; .64 MG/G; MG/G
CREAM TOPICAL 2 TIMES DAILY
Qty: 1 TUBE | Refills: 0 | Status: SHIPPED | OUTPATIENT
Start: 2017-06-21 | End: 2018-01-15 | Stop reason: SDUPTHER

## 2017-06-21 RX ORDER — DONEPEZIL HYDROCHLORIDE 10 MG/1
10 TABLET, FILM COATED ORAL NIGHTLY
Qty: 90 TABLET | Refills: 3 | Status: SHIPPED | OUTPATIENT
Start: 2017-06-21 | End: 2018-06-10 | Stop reason: SDUPTHER

## 2017-11-30 RX ORDER — ATORVASTATIN CALCIUM 10 MG/1
TABLET, FILM COATED ORAL
Qty: 90 TABLET | Refills: 1 | Status: SHIPPED | OUTPATIENT
Start: 2017-11-30 | End: 2018-05-05 | Stop reason: SDUPTHER

## 2018-01-08 ENCOUNTER — OFFICE VISIT (OUTPATIENT)
Dept: FAMILY MEDICINE | Facility: CLINIC | Age: 83
End: 2018-01-08
Payer: MEDICARE

## 2018-01-08 VITALS
HEIGHT: 61 IN | OXYGEN SATURATION: 96 % | TEMPERATURE: 98 F | BODY MASS INDEX: 18.36 KG/M2 | HEART RATE: 88 BPM | RESPIRATION RATE: 18 BRPM | WEIGHT: 97.25 LBS | SYSTOLIC BLOOD PRESSURE: 98 MMHG | DIASTOLIC BLOOD PRESSURE: 66 MMHG

## 2018-01-08 DIAGNOSIS — I10 ESSENTIAL HYPERTENSION: ICD-10-CM

## 2018-01-08 DIAGNOSIS — K59.00 CONSTIPATION, UNSPECIFIED CONSTIPATION TYPE: ICD-10-CM

## 2018-01-08 DIAGNOSIS — R63.4 ABNORMAL WEIGHT LOSS: Primary | ICD-10-CM

## 2018-01-08 PROCEDURE — 99214 OFFICE O/P EST MOD 30 MIN: CPT | Mod: S$GLB,,, | Performed by: FAMILY MEDICINE

## 2018-01-08 PROCEDURE — 99999 PR PBB SHADOW E&M-EST. PATIENT-LVL IV: CPT | Mod: PBBFAC,,, | Performed by: FAMILY MEDICINE

## 2018-01-08 RX ORDER — ACETAMINOPHEN 500 MG
TABLET ORAL
COMMUNITY

## 2018-01-08 RX ORDER — LACTULOSE 10 G/15ML
10-20 SOLUTION ORAL DAILY PRN
Qty: 450 ML | Refills: 5 | Status: SHIPPED | OUTPATIENT
Start: 2018-01-08 | End: 2018-01-18

## 2018-01-08 RX ORDER — ASPIRIN 325 MG
325 TABLET, DELAYED RELEASE (ENTERIC COATED) ORAL DAILY
COMMUNITY

## 2018-01-08 RX ORDER — MULTIVIT WITH MINERALS/HERBS
1 TABLET ORAL DAILY
COMMUNITY

## 2018-01-08 NOTE — PROGRESS NOTES
CHIEF COMPLAINT: This is a 86-year-old female complaining of constipation.    SUBJECTIVE: The patient is accompanied today by her PCA who provides the history.  Patient has dementia.  She has been having problems with constipation for weeks to months.  Currently, she has not had a bowel movement for one month.  She has required frequent enemas.  Enemas have produced large hard stools.  Stools are dark brown without blood or melena.  Patient has been taking Metamucil without improvement.  MiraLAX has been tried without significant improvement.  She drinks plenty of water while the PCA is with her for 4 hours, but it is uncertain if she's drinking water during the rest of the day.  Patient has lost 25 pounds in the last 9 months.  It's unclear whether she has decreased appetite or is just not eating.  Patient does not have nausea, vomiting, and denies abdominal pain.  The patient has essential hypertension, which is treated with HCTZ 25 mg daily.  Blood pressure today is 98/66.  PCA requests eye exam because she seems to have problems focusing when eating.    ROS:  GENERAL: Patient denies fever, chills, night sweats. Patient denies weight gain. Patient denies anorexia, fatigue, weakness or swollen glands.  SKIN: Patient denies rash or hair loss.  HEENT: Patient denies sore throat, ear pain, hearing loss, nasal congestion, or runny nose. Patient denies visual disturbance, eye irritation or discharge.  LUNGS: Patient denies cough, wheeze or hemoptysis.  CARDIOVASCULAR: Patient denies chest pain, shortness of breath, palpitations, syncope or lower extremity edema.  GI: Patient denies abdominal pain, nausea, vomiting, diarrhea, constipation, blood in stool or melena.  GENITOURINARY: Patient denies dysuria, frequency, hematuria, nocturia, urgency or incontinence.  MUSCULOSKELETAL: Patient denies joint swelling, redness or warmth.  NEUROLOGIC: Patient denies headache, vertigo, paresthesias, weakness in limb, dysarthria,  dysphagia or abnormality of gait.  PSYCHIATRIC: Patient denies anxiety, depression, or memory loss.     OBJECTIVE:   GENERAL: Well-developed well-nourished, elderly, black female alert and oriented x3, in no acute distress. Mild to moderate cognitive impairment. Weight loss of 25 pounds in the last 9 months.  SKIN: Confluent, maculopapular eruption underneath breasts.   HEENT: Eyes: Clear conjunctivae. No scleral icterus. Pupils equal reactive to light and accommodation. Ears: Clear TMs. Clear canals. Nose: Without congestion. Pharynx: Without injection or exudates.   NECK: Supple, normal range of motion. No masses, lymphadenopathy or enlarged thyroid. No JVD. Carotids 2+ and equal. No bruits.   LUNGS: Clear to auscultation. Normal respiratory effort.   CARDIOVASCULAR: Regular rhythm with frequent irregular beats, normal S1, S2 without murmur, gallop or rub.   BACK: No CVA or spinal tenderness.   ABDOMEN: Normal appearance. Active bowel sounds. Soft, nontender without mass or organomegaly. No rebound or guarding.   EXTREMITIES: Without cyanosis, clubbing or edema. Distal pulses 2+ and equal. Normal range of motion in all extremities. No joint effusion, erythema or warmth.   NEUROLOGIC:  Motor strength equal bilaterally. Sensation normal to touch. Gait unsteady without support.  Mild intention tremor.     ASSESSMENT:  1. Abnormal weight loss    2. Constipation, unspecified constipation type    3. Essential hypertension      PLAN:   1.  Increase water intake to 48-64 ounces daily.  2.  Increase fiber in diet.  3.  Lactulose 15-30 mL daily as needed.  4.  Follow-up if no improvement or worsening symptoms.  5.  Return to clinic in 3 months for preventive health exam and fasting lab.

## 2018-01-10 ENCOUNTER — OFFICE VISIT (OUTPATIENT)
Dept: OPHTHALMOLOGY | Facility: CLINIC | Age: 83
End: 2018-01-10
Payer: MEDICARE

## 2018-01-10 DIAGNOSIS — H52.4 BILATERAL PRESBYOPIA: ICD-10-CM

## 2018-01-10 DIAGNOSIS — H25.13 CATARACT, NUCLEAR SCLEROTIC SENILE, BILATERAL: Primary | ICD-10-CM

## 2018-01-10 PROCEDURE — 92015 DETERMINE REFRACTIVE STATE: CPT | Mod: S$GLB,,, | Performed by: OPTOMETRIST

## 2018-01-10 PROCEDURE — 92004 COMPRE OPH EXAM NEW PT 1/>: CPT | Mod: S$GLB,,, | Performed by: OPTOMETRIST

## 2018-01-10 PROCEDURE — 99999 PR PBB SHADOW E&M-EST. PATIENT-LVL I: CPT | Mod: PBBFAC,,, | Performed by: OPTOMETRIST

## 2018-01-10 NOTE — PROGRESS NOTES
HPI     Patient states no visual complaints. Last eye exam 07/17/2014 [facility]. Worker   states patient may be having a problem with vision due to focusing when   eating. Patient only notice what's in front of her.    Last edited by Arabella Fischer on 1/10/2018  1:35 PM. (History)            Assessment /Plan     For exam results, see Encounter Report.    Cataract, nuclear sclerotic senile, bilateral    Bilateral presbyopia      Significant cataracts OU, pt defers the cataract evaluation consult.  +2.50 readers prn     RTC cat radhaal

## 2018-01-10 NOTE — LETTER
January 10, 2018      Sara Mcneal MD  [address]  [facility] [address]           [facility]  [address] [place] [id number]  Phone: [phone]  Fax: [phone]          Patient: Kota Arechiga   MR Number: [id number]   YOB: 1931   Date of Visit: 1/10/2018       Dear Dr. Sara Mcneal:    Thank you for referring Kota Arechiga to me for evaluation. Attached you will find relevant portions of my assessment and plan of care.    If you have questions, please do not hesitate to call me. I look forward to following Kota Arechiga along with you.    Sincerely,    Antonio Melendez, OD    Enclosure  CC:  No Recipients    If you would like to receive this communication electronically, please contact [email or web address] or [phone] to request more information on [employer] access.    For providers and/or their staff who would like to refer a patient to [facility], please contact us through our one-stop-shop provider referral line, [facility], at [phone].    If you feel you have received this communication in error or would no longer like to receive these types of communications, please e-mail [email or web address]

## 2018-01-15 RX ORDER — CLOTRIMAZOLE AND BETAMETHASONE DIPROPIONATE 10; .64 MG/G; MG/G
CREAM TOPICAL
Qty: 15 G | Refills: 3 | Status: SHIPPED | OUTPATIENT
Start: 2018-01-15

## 2018-01-16 RX ORDER — HYDROCHLOROTHIAZIDE 25 MG/1
25 TABLET ORAL EVERY MORNING
Qty: 90 TABLET | Refills: 0 | Status: SHIPPED | OUTPATIENT
Start: 2018-01-16 | End: 2018-04-06 | Stop reason: SDUPTHER

## 2018-01-22 ENCOUNTER — PATIENT OUTREACH (OUTPATIENT)
Dept: ADMINISTRATIVE | Facility: HOSPITAL | Age: 83
End: 2018-01-22

## 2018-02-05 ENCOUNTER — OFFICE VISIT (OUTPATIENT)
Dept: FAMILY MEDICINE | Facility: CLINIC | Age: 83
End: 2018-02-05
Payer: MEDICARE

## 2018-02-05 VITALS
TEMPERATURE: 98 F | DIASTOLIC BLOOD PRESSURE: 70 MMHG | BODY MASS INDEX: 18.4 KG/M2 | HEIGHT: 61 IN | RESPIRATION RATE: 18 BRPM | OXYGEN SATURATION: 100 % | WEIGHT: 97.44 LBS | SYSTOLIC BLOOD PRESSURE: 110 MMHG | HEART RATE: 126 BPM

## 2018-02-05 DIAGNOSIS — N18.30 CKD (CHRONIC KIDNEY DISEASE), STAGE III: ICD-10-CM

## 2018-02-05 DIAGNOSIS — F03.90 DEMENTIA WITHOUT BEHAVIORAL DISTURBANCE, UNSPECIFIED DEMENTIA TYPE: Primary | ICD-10-CM

## 2018-02-05 DIAGNOSIS — R63.4 WEIGHT LOSS: ICD-10-CM

## 2018-02-05 DIAGNOSIS — I10 ESSENTIAL HYPERTENSION: ICD-10-CM

## 2018-02-05 PROCEDURE — 1126F AMNT PAIN NOTED NONE PRSNT: CPT | Mod: S$GLB,,, | Performed by: FAMILY MEDICINE

## 2018-02-05 PROCEDURE — 1159F MED LIST DOCD IN RCRD: CPT | Mod: S$GLB,,, | Performed by: FAMILY MEDICINE

## 2018-02-05 PROCEDURE — 99999 PR PBB SHADOW E&M-EST. PATIENT-LVL III: CPT | Mod: PBBFAC,,, | Performed by: FAMILY MEDICINE

## 2018-02-05 PROCEDURE — 99214 OFFICE O/P EST MOD 30 MIN: CPT | Mod: S$GLB,,, | Performed by: FAMILY MEDICINE

## 2018-02-05 PROCEDURE — 3008F BODY MASS INDEX DOCD: CPT | Mod: S$GLB,,, | Performed by: FAMILY MEDICINE

## 2018-02-05 RX ORDER — LACTULOSE 10 G/15ML
SOLUTION ORAL; RECTAL
Refills: 0 | COMMUNITY
Start: 2018-01-08 | End: 2019-04-03 | Stop reason: SDUPTHER

## 2018-02-05 NOTE — PROGRESS NOTES
CHIEF COMPLAINT: This is an 86-year-old female here for follow-up chronic medical conditions.    SUBJECTIVE: Patient is brought in today by her grandson.  He reports that he is unable to care for both his grandmother and his mother who is wheelchair-bound due to stroke.  He works full time in addition to caring for his mother and grandmother.  PCAs are involved in their care, but are unable to provide 24/7 care.  The patient has dementia and is unable to care for herself.  She is not physically active and sits all day.  She does not ambulate, although she has a walker at home.  She has very little social activity.  She is incontinent of urine.  Recent problem with constipation seems to have improved with the use of lactulose.  Patient has lost 25 pounds in the last 10 months, but has had no weight loss in the last month.  Her grandson would like advice regarding nursing home placement.    ROS:  GENERAL: Patient denies fever, chills, night sweats. Patient denies weight gain. Patient denies anorexia, fatigue, weakness or swollen glands.  SKIN: Patient denies rash or hair loss.  HEENT: Patient denies sore throat, ear pain, hearing loss, nasal congestion, or runny nose. Patient denies visual disturbance, eye irritation or discharge.  LUNGS: Patient denies cough, wheeze or hemoptysis.  CARDIOVASCULAR: Patient denies chest pain, shortness of breath, palpitations, syncope or lower extremity edema.  GI: Patient denies abdominal pain, nausea, vomiting, diarrhea, constipation, blood in stool or melena.  GENITOURINARY: Patient denies dysuria, frequency, hematuria, nocturia, urgency or incontinence.  MUSCULOSKELETAL: Patient denies joint swelling, redness or warmth.  NEUROLOGIC: Patient denies headache, vertigo, paresthesias, weakness in limb, dysarthria, dysphagia or abnormality of gait.  PSYCHIATRIC: Patient denies anxiety, depression, or memory loss.     OBJECTIVE:   GENERAL: Well-developed well-nourished, elderly, black  female alert and oriented x3, in no acute distress. Mild to moderate cognitive impairment.   SKIN: Clear without rash.  Normal color and tone.  HEENT: Eyes: Clear conjunctivae. No scleral icterus.   NECK: Supple, normal range of motion. No masses, lymphadenopathy or enlarged thyroid. No JVD. Carotids 2+ and equal. No bruits.   LUNGS: Clear to auscultation. Normal respiratory effort.   CARDIOVASCULAR: Regular rhythm with frequent irregular beats, normal S1, S2 without murmur, gallop or rub.   BACK: No CVA or spinal tenderness.   ABDOMEN: Soft, nontender without mass or organomegaly. No rebound or guarding.   EXTREMITIES: Without cyanosis, clubbing or edema. Distal pulses 2+ and equal. Normal range of motion in all extremities. No joint effusion, erythema or warmth.   NEUROLOGIC:  Motor strength equal bilaterally. Sensation normal to touch. Gait unsteady without support.  Mild intention tremor.     Discussed nursing home options and encouraged grandson to visit different facilities.  Nursing staff also discussed experiences working at different nursing homes and opinion of specific facilities.     ASSESSMENT:  1. Dementia without behavioral disturbance, unspecified dementia type    2. Weight loss    3. Essential hypertension    4. CKD (chronic kidney disease), stage III      PLAN:   1.  Continue regular medications.  2.  Contact us with any further questions regarding nursing home placement.  3.  Assured grandson that all necessary paperwork and testing could be done here in a timely fashion.    This visit was 25 minutes, greater than 50% of which involved counseling.

## 2018-02-20 ENCOUNTER — TELEPHONE (OUTPATIENT)
Dept: FAMILY MEDICINE | Facility: CLINIC | Age: 83
End: 2018-02-20

## 2018-02-20 NOTE — TELEPHONE ENCOUNTER
----- Message from Talya Avalos sent at 2/20/2018  1:54 PM CST -----  Contact: self [phone]  States that she misplaced the list of providers that she needed to see. Please call back at [phone]//thank you acc

## 2018-04-06 RX ORDER — LEVOTHYROXINE SODIUM 50 UG/1
TABLET ORAL
Qty: 90 TABLET | Refills: 0 | Status: SHIPPED | OUTPATIENT
Start: 2018-04-06 | End: 2018-08-24 | Stop reason: SDUPTHER

## 2018-04-06 RX ORDER — HYDROCHLOROTHIAZIDE 25 MG/1
TABLET ORAL
Qty: 90 TABLET | Refills: 0 | Status: SHIPPED | OUTPATIENT
Start: 2018-04-06 | End: 2018-08-27 | Stop reason: SDUPTHER

## 2018-04-11 ENCOUNTER — OFFICE VISIT (OUTPATIENT)
Dept: OPHTHALMOLOGY | Facility: CLINIC | Age: 83
End: 2018-04-11
Payer: MEDICARE

## 2018-04-11 DIAGNOSIS — H52.4 BILATERAL PRESBYOPIA: ICD-10-CM

## 2018-04-11 DIAGNOSIS — H25.13 CATARACT, NUCLEAR SCLEROTIC SENILE, BILATERAL: Primary | ICD-10-CM

## 2018-04-11 PROCEDURE — 92012 INTRM OPH EXAM EST PATIENT: CPT | Mod: S$GLB,,, | Performed by: OPTOMETRIST

## 2018-04-11 PROCEDURE — 99999 PR PBB SHADOW E&M-EST. PATIENT-LVL I: CPT | Mod: PBBFAC,,, | Performed by: OPTOMETRIST

## 2018-04-11 NOTE — PROGRESS NOTES
NGHIA     Grandson states his mother wanted grandmother left eye checked due to it   looking different from right eye x 1 week. Last eye visit 01/10/2018 [facility].   Left reading glasses today.    Last edited by Antonio Melendez, OD on 4/11/2018  1:55 PM. (History)            Assessment /Plan     For exam results, see Encounter Report.    Cataract, nuclear sclerotic senile, bilateral    Bilateral presbyopia      Significant cataracts OU, discussed cataract surgery including Specialty IOL's    Consult Ophthalmology for cataract evaluation at next available appointment.    Discussed with Ms Arechiga grandvi, he will relate to Ms Arechiga daughter.

## 2018-04-11 NOTE — PATIENT INSTRUCTIONS
Cataract, nuclear sclerotic senile, bilateral     Bilateral presbyopia        Significant cataracts OU, discussed cataract surgery including Specialty IOL's     Consult Ophthalmology for cataract evaluation at next available appointment.     Discussed with Ms Arechiga grandson, he will relate to Ms Arechiga daughter.

## 2018-04-13 ENCOUNTER — PATIENT OUTREACH (OUTPATIENT)
Dept: ADMINISTRATIVE | Facility: HOSPITAL | Age: 83
End: 2018-04-13

## 2018-04-30 ENCOUNTER — OFFICE VISIT (OUTPATIENT)
Dept: OPHTHALMOLOGY | Facility: CLINIC | Age: 83
End: 2018-04-30
Payer: MEDICARE

## 2018-04-30 ENCOUNTER — TELEPHONE (OUTPATIENT)
Dept: OPHTHALMOLOGY | Facility: CLINIC | Age: 83
End: 2018-04-30

## 2018-04-30 PROCEDURE — 76512 OPH US DX B-SCAN: CPT | Mod: LT,S$GLB,, | Performed by: OPHTHALMOLOGY

## 2018-04-30 PROCEDURE — 99999 PR PBB SHADOW E&M-EST. PATIENT-LVL III: CPT | Mod: PBBFAC,,, | Performed by: OPHTHALMOLOGY

## 2018-04-30 PROCEDURE — 92136 OPHTHALMIC BIOMETRY: CPT | Mod: LT,S$GLB,, | Performed by: OPHTHALMOLOGY

## 2018-04-30 PROCEDURE — 92012 INTRM OPH EXAM EST PATIENT: CPT | Mod: S$GLB,,, | Performed by: OPHTHALMOLOGY

## 2018-04-30 RX ORDER — PREDNISOLONE ACETATE 10 MG/ML
1 SUSPENSION/ DROPS OPHTHALMIC 4 TIMES DAILY
Qty: 1 BOTTLE | Refills: 2 | Status: SHIPPED | OUTPATIENT
Start: 2018-04-30 | End: 2018-07-10 | Stop reason: ALTCHOICE

## 2018-04-30 RX ORDER — GATIFLOXACIN 5 MG/ML
1 SOLUTION/ DROPS OPHTHALMIC 2 TIMES DAILY
Qty: 1 BOTTLE | Refills: 2 | Status: SHIPPED | OUTPATIENT
Start: 2018-04-30 | End: 2018-07-10 | Stop reason: ALTCHOICE

## 2018-04-30 RX ORDER — KETOROLAC TROMETHAMINE 5 MG/ML
1 SOLUTION OPHTHALMIC 4 TIMES DAILY
Qty: 1 BOTTLE | Refills: 2 | Status: SHIPPED | OUTPATIENT
Start: 2018-04-30 | End: 2019-01-03

## 2018-04-30 NOTE — PROGRESS NOTES
SUBJECTIVE:   Kota Arechiga is a 87 y.o. female   Uncorrected distance visual acuity was 20/50 -2 in the right eye and NLP in the left eye.   Chief Complaint   Patient presents with    Cataract     Cataract eval per TRF        HPI:  HPI     Cataract    Additional comments: Cataract eval per TRF           Comments   Grandson states grandmother has been having a decrease in vision left eye.   No ocular pain, not using any drops.     Last [facility] Exam 3/8/12  1.RE  2. Cataracts       Last edited by Sunny Long on 4/30/2018  2:32 PM. (History)        Assessment /Plan :  1. Total or mature cataract    Visually Significant Cataract OS > OD:   Patient reports decreased vision consistent with the clinical amount of lenticular opacity,  which reaches the level of visual significance and affects activities of daily living such as  watch TV. Risks, benefits, and alternatives to cataract surgery were  discussed.  IOL options were discussed, including Premium IOL'S and the associated  side effects and additional patient cost associated with them as well as patient's visual  goals. The pt expressed a desire to proceed with surgery with the potential for some  reasonable degree of visual improvement and was consented.  Risks of loss of vision and eye reviewed as well as possibility of need for spectacle correction after surgery even with premium implants. Verbal and written preop  instructions were provided to the pt.            Pt is interested in traditional monofocal IOL aiming for:       Distance OU and understands that  glasses will be generally needed at all times for near vision and often for finer distance correction especially for higher degrees of astigmatism.            Final visual acuity may be limited by unknown retinal status    Pt wishes to have Phaco/IOL  OS     Requests a Monofocal IOL.    Will aim for distance    Other considerations: BLOCK, CORNEAL PRECAUTIONS, TRYPAN BLUE and COMPLEX CATARACT    Patient  was counciled regarding the increased risk of the surgery as a result of shallow anterior chamber, mature cataract and the need for capsular staining    which could lead to the need for an anterior vitrectomy.     Discussed with family that also doing no surgery OS is a good option due to pt age, wheelchair status and dementia and limited need for binocularity as final outcome uncertain and still good vision in other eye. Grandson will discuss it with his mother. I also said she could call me to discuss, too.

## 2018-05-06 RX ORDER — ATORVASTATIN CALCIUM 10 MG/1
TABLET, FILM COATED ORAL
Qty: 90 TABLET | Refills: 1 | Status: SHIPPED | OUTPATIENT
Start: 2018-05-06 | End: 2018-12-19 | Stop reason: SDUPTHER

## 2018-05-14 ENCOUNTER — TELEPHONE (OUTPATIENT)
Dept: OPHTHALMOLOGY | Facility: CLINIC | Age: 83
End: 2018-05-14

## 2018-05-21 ENCOUNTER — TELEPHONE (OUTPATIENT)
Dept: FAMILY MEDICINE | Facility: CLINIC | Age: 83
End: 2018-05-21

## 2018-05-21 NOTE — TELEPHONE ENCOUNTER
----- Message from Danis Garcia sent at 5/21/2018 10:48 AM CDT -----  Contact: Mil, pt's grandson  He's calling in regards to the pt being worked back into the schedule on Thursday, 5/24/18, \[facility]\"" advise, [phone] (home) [phone] (work), pt's transportation has to work on Tuesday.

## 2018-05-25 ENCOUNTER — TELEPHONE (OUTPATIENT)
Dept: FAMILY MEDICINE | Facility: CLINIC | Age: 83
End: 2018-05-25
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
## 2018-06-07 ENCOUNTER — OFFICE VISIT (OUTPATIENT)
Dept: OPHTHALMOLOGY | Facility: CLINIC | Age: 83
End: 2018-06-07
Payer: MEDICARE

## 2018-06-07 DIAGNOSIS — Z98.890 POST-OPERATIVE STATE: Primary | ICD-10-CM

## 2018-06-07 PROCEDURE — 99999 PR PBB SHADOW E&M-EST. PATIENT-LVL II: CPT | Mod: PBBFAC,,, | Performed by: OPHTHALMOLOGY

## 2018-06-07 PROCEDURE — 99024 POSTOP FOLLOW-UP VISIT: CPT | Mod: S$GLB,,, | Performed by: OPHTHALMOLOGY

## 2018-06-07 NOTE — PROGRESS NOTES
SUBJECTIVE:   Kota Arechiga is a 87 y.o. female   Uncorrected distance visual acuity was not recorded in the right eye and 20/400 in the left eye.   Chief Complaint   Patient presents with    Post-op Evaluation        HPI:  HPI     Patient arrived patched has not started her drops yet patient states a   little pain in OS.        Last [facility]F Exam 3/8/12  1.RE  2. Cataracts OD  PCIOL OS +20.5 SN60WF (distance) 6-6-18      OS PRED QID, KET QID, GAT BID    Last edited by SHERRI Moreno on 6/7/2018  1:04 PM. (History)        Assessment /Plan :  1. Post-operative state Exam:  SLE:  S/C: normal  K : trace k fold  AC: trace cell and flare  Iris: normal  Lens: PCIOL    IMP:  PO Day 1 S/P Phaco/IOL OS : Doing well.    Plan:  Continue gtts to operative eye:  Pred 1% QID  Ketorolac QID  Zymaxid BID  Reinstructed in importance of absolute compliance with Post-OP instructions including medications, shield at bedtime, and limitation of activities. Follow up appointments in approximately one and six weeks or call immediately for increased pain, redness or vision loss.

## 2018-06-11 RX ORDER — DONEPEZIL HYDROCHLORIDE 10 MG/1
TABLET, FILM COATED ORAL
Qty: 90 TABLET | Refills: 0 | Status: SHIPPED | OUTPATIENT
Start: 2018-06-11 | End: 2018-09-24 | Stop reason: SDUPTHER

## 2018-06-12 ENCOUNTER — OFFICE VISIT (OUTPATIENT)
Dept: OPHTHALMOLOGY | Facility: CLINIC | Age: 83
End: 2018-06-12
Payer: MEDICARE

## 2018-06-12 DIAGNOSIS — Z98.890 POST-OPERATIVE STATE: Primary | ICD-10-CM

## 2018-06-12 PROCEDURE — 99024 POSTOP FOLLOW-UP VISIT: CPT | Mod: S$GLB,,, | Performed by: OPHTHALMOLOGY

## 2018-06-12 PROCEDURE — 99999 PR PBB SHADOW E&M-EST. PATIENT-LVL II: CPT | Mod: PBBFAC,,, | Performed by: OPHTHALMOLOGY

## 2018-06-12 NOTE — PROGRESS NOTES
SUBJECTIVE:   Kota Arechiga is a 87 y.o. female   Uncorrected distance visual acuity was not recorded in the right eye and 20/50 +2 in the left eye.   Chief Complaint   Patient presents with    Post-op Evaluation     1 wk PO PCIOL OS        HPI:  HPI     Post-op Evaluation    Additional comments: 1 wk PO PCIOL OS           Comments   1 wk PO PCIOL OS  No pain slight discomfort with drops  VA improving OS    Last TRF Exam 3/8/12  1.RE  2. Cataracts OD  PCIOL OS +20.5 SN60WF (distance) 6-6-18    OS PRED QID, KET QID, GAT BID       Last edited by Elizabeth Adhikari on 6/12/2018 11:31 AM. (History)        Assessment /Plan :  1. Post-operative state Slit lamp exam:  L/L: nl  K: clear, wound sealed  AC: trace cell and flare  Iris/Lens: IOL centered and stable      IMP:  PO Week 1 S/P Phaco/ IOL OS : Doing well with no evidence of infection or abnormal inflammation.     Plan:  Pt given and instructed in one week PO instructions. D/C zymaxid and start to taper off Ketorlac and Pred Forte 1% weekly. Can resume normal activitites and d/c eye shield. OTC reading glasses can be used until evaluated for final MR. Follow up in one month or PRN pain, redness, vision loss, or other concerns.     rtc TF next month

## 2018-07-10 ENCOUNTER — OFFICE VISIT (OUTPATIENT)
Dept: OPHTHALMOLOGY | Facility: CLINIC | Age: 83
End: 2018-07-10
Payer: MEDICARE

## 2018-07-10 DIAGNOSIS — Z98.890 POST-OPERATIVE STATE: Primary | ICD-10-CM

## 2018-07-10 PROCEDURE — 99999 PR PBB SHADOW E&M-EST. PATIENT-LVL I: CPT | Mod: PBBFAC,,, | Performed by: OPTOMETRIST

## 2018-07-10 PROCEDURE — 99024 POSTOP FOLLOW-UP VISIT: CPT | Mod: S$GLB,,, | Performed by: OPTOMETRIST

## 2018-07-10 NOTE — PROGRESS NOTES
HPI     1 Month S/P cat SX OS per [facility].  No visual complaints.  Last eye visit 06/12/2018 [facility].    Last edited by Arabella Fischer on 7/10/2018  2:42 PM. (History)            Assessment /Plan     For exam results, see Encounter Report.    Post-operative state      Stable IOL OU.    RTC 6 months recheck

## 2018-08-07 ENCOUNTER — OFFICE VISIT (OUTPATIENT)
Dept: FAMILY MEDICINE | Facility: CLINIC | Age: 83
End: 2018-08-07
Payer: MEDICARE

## 2018-08-07 ENCOUNTER — LAB VISIT (OUTPATIENT)
Dept: LAB | Facility: HOSPITAL | Age: 83
End: 2018-08-07
Attending: FAMILY MEDICINE
Payer: MEDICARE

## 2018-08-07 VITALS
DIASTOLIC BLOOD PRESSURE: 88 MMHG | TEMPERATURE: 98 F | WEIGHT: 88.63 LBS | HEIGHT: 61 IN | BODY MASS INDEX: 16.73 KG/M2 | HEART RATE: 58 BPM | SYSTOLIC BLOOD PRESSURE: 122 MMHG

## 2018-08-07 DIAGNOSIS — E03.9 ACQUIRED HYPOTHYROIDISM: ICD-10-CM

## 2018-08-07 DIAGNOSIS — Z00.00 PREVENTATIVE HEALTH CARE: Primary | ICD-10-CM

## 2018-08-07 DIAGNOSIS — E78.2 MIXED HYPERLIPIDEMIA: ICD-10-CM

## 2018-08-07 DIAGNOSIS — I10 ESSENTIAL HYPERTENSION: ICD-10-CM

## 2018-08-07 DIAGNOSIS — N18.30 CKD (CHRONIC KIDNEY DISEASE), STAGE III: ICD-10-CM

## 2018-08-07 DIAGNOSIS — F03.90 DEMENTIA WITHOUT BEHAVIORAL DISTURBANCE, UNSPECIFIED DEMENTIA TYPE: ICD-10-CM

## 2018-08-07 LAB
ALBUMIN SERPL BCP-MCNC: 3.8 G/DL
ALP SERPL-CCNC: 103 U/L
ALT SERPL W/O P-5'-P-CCNC: 32 U/L
ANION GAP SERPL CALC-SCNC: 8 MMOL/L
AST SERPL-CCNC: 32 U/L
BASOPHILS # BLD AUTO: 0.05 K/UL
BASOPHILS NFR BLD: 0.5 %
BILIRUB SERPL-MCNC: 0.5 MG/DL
BUN SERPL-MCNC: 19 MG/DL
CALCIUM SERPL-MCNC: 10.3 MG/DL
CHLORIDE SERPL-SCNC: 102 MMOL/L
CHOLEST SERPL-MCNC: 173 MG/DL
CHOLEST/HDLC SERPL: 3.3 {RATIO}
CO2 SERPL-SCNC: 31 MMOL/L
CREAT SERPL-MCNC: 1 MG/DL
DIFFERENTIAL METHOD: NORMAL
EOSINOPHIL # BLD AUTO: 0.1 K/UL
EOSINOPHIL NFR BLD: 0.9 %
ERYTHROCYTE [DISTWIDTH] IN BLOOD BY AUTOMATED COUNT: 14.4 %
EST. GFR  (AFRICAN AMERICAN): 58.5 ML/MIN/1.73 M^2
EST. GFR  (NON AFRICAN AMERICAN): 50.8 ML/MIN/1.73 M^2
GLUCOSE SERPL-MCNC: 77 MG/DL
HCT VFR BLD AUTO: 37.5 %
HDLC SERPL-MCNC: 53 MG/DL
HDLC SERPL: 30.6 %
HGB BLD-MCNC: 12.3 G/DL
IMM GRANULOCYTES # BLD AUTO: 0.02 K/UL
IMM GRANULOCYTES NFR BLD AUTO: 0.2 %
LDLC SERPL CALC-MCNC: 100.4 MG/DL
LYMPHOCYTES # BLD AUTO: 2.1 K/UL
LYMPHOCYTES NFR BLD: 22.5 %
MCH RBC QN AUTO: 30.7 PG
MCHC RBC AUTO-ENTMCNC: 32.8 G/DL
MCV RBC AUTO: 94 FL
MONOCYTES # BLD AUTO: 0.6 K/UL
MONOCYTES NFR BLD: 6.7 %
NEUTROPHILS # BLD AUTO: 6.3 K/UL
NEUTROPHILS NFR BLD: 69.2 %
NONHDLC SERPL-MCNC: 120 MG/DL
NRBC BLD-RTO: 0 /100 WBC
PLATELET # BLD AUTO: 192 K/UL
PMV BLD AUTO: 12.7 FL
POTASSIUM SERPL-SCNC: 3.5 MMOL/L
PROT SERPL-MCNC: 7.4 G/DL
RBC # BLD AUTO: 4.01 M/UL
SODIUM SERPL-SCNC: 141 MMOL/L
TRIGL SERPL-MCNC: 98 MG/DL
TSH SERPL DL<=0.005 MIU/L-ACNC: 1.16 UIU/ML
WBC # BLD AUTO: 9.15 K/UL

## 2018-08-07 PROCEDURE — 80053 COMPREHEN METABOLIC PANEL: CPT

## 2018-08-07 PROCEDURE — 36415 COLL VENOUS BLD VENIPUNCTURE: CPT | Mod: PO

## 2018-08-07 PROCEDURE — 84443 ASSAY THYROID STIM HORMONE: CPT

## 2018-08-07 PROCEDURE — 99397 PER PM REEVAL EST PAT 65+ YR: CPT | Mod: S$GLB,,, | Performed by: FAMILY MEDICINE

## 2018-08-07 PROCEDURE — 85025 COMPLETE CBC W/AUTO DIFF WBC: CPT

## 2018-08-07 PROCEDURE — 99999 PR PBB SHADOW E&M-EST. PATIENT-LVL III: CPT | Mod: PBBFAC,,, | Performed by: FAMILY MEDICINE

## 2018-08-07 PROCEDURE — 80061 LIPID PANEL: CPT

## 2018-08-07 NOTE — PROGRESS NOTES
CHIEF COMPLAINT:  This is a 87-year-old female here for preventive health exam.    SUBJECTIVE:  The patient is brought in by her grandson.  He is concerned about her weight loss.  Lately she has been eating much better and even asking for more food however she has lost 9 lb in the last 6 months.  Her BMI is 17.02.  Patient has dementia for which she takes Aricept 10 mg daily.  She takes levothyroxine 50 mcg for hypothyroidism and atorvastatin for hyperlipidemia.  The patient lives at home with her daughter, who is wheelchair-bound due to her stroke.  Caregivers for the daughter usually provide meals.  The patient is not physically active.  She sits all day and gets upper rarely to ambulate.  She has a walker at home but does not use it.  She has very little social activity.   Patient has incontinence of urine and stool.  She requires help to  dress herself and feeds herself at home.   Patient has an intermittent tremor in her hands. patient has osteopenia.     Eye exam July 2018. Mammogram November 2016. Bone DEXA scan October 2015, due again in October 2020  . Colonoscopy June 2009. TD booster November 2012. Pneumovax June 2005.  Prevnar October 2015.  Influenza vaccine October 2015.     ROS:  GENERAL: Patient denies fever, chills, night sweats. Patient denies weight gain. Patient denies anorexia, fatigue, weakness or swollen glands.  SKIN: Patient denies rash or hair loss.  HEENT: Patient denies sore throat, ear pain, hearing loss, nasal congestion, or runny nose. Patient denies visual disturbance, eye irritation or discharge.  LUNGS: Patient denies cough, wheeze or hemoptysis.  CARDIOVASCULAR: Patient denies chest pain, shortness of breath, palpitations, syncope or lower extremity edema.  GI: Patient denies abdominal pain, nausea, vomiting, diarrhea, constipation, blood in stool or melena.  GENITOURINARY: Patient denies pelvic pain, vaginal discharge, itch or odor. Patient denies irregular vaginal bleeding.  Patient denies dysuria, frequency, hematuria, nocturia, urgency or incontinence.  BREASTS: Patient denies breast pain, mass or nipple discharge.  MUSCULOSKELETAL: Patient denies joint swelling, redness or warmth.  NEUROLOGIC: Patient denies headache, vertigo, paresthesias, weakness in limb, dysarthria, dysphagia or abnormality of gait.  PSYCHIATRIC: Patient denies anxiety, depression, or memory loss.     OBJECTIVE:   GENERAL: Well-developed well-nourished, elderly, black female alert and oriented x3, in no acute distress. Mild to moderate cognitive impairment. Weight loss of 9 pounds in the last 6 months.  SKIN:  Clear  without rash.  Normal color and tone.  HEENT: Eyes: Clear conjunctivae. No scleral icterus. Pupils equal reactive to light and accommodation. Ears: Clear TMs. Clear canals. Nose: Without congestion. Pharynx: Without injection or exudates.   NECK: Supple, normal range of motion. No masses, lymphadenopathy or enlarged thyroid. No JVD. Carotids 2+ and equal. No bruits.   LUNGS: Clear to auscultation. Normal respiratory effort.   CARDIOVASCULAR: Regular rhythm with frequent irregular beats, normal S1, S2 without murmur, gallop or rub.   BACK: No CVA or spinal tenderness.   BREASTS: No masses, tenderness or nipple discharge.   ABDOMEN: Normal appearance. Active bowel sounds. Soft, nontender without mass or organomegaly. No rebound or guarding.   EXTREMITIES: Without cyanosis, clubbing or edema. Distal pulses 2+ and equal. Normal range of motion in all extremities. No joint effusion, erythema or warmth.   NEUROLOGIC: Cranial nerves II through XII without deficit. Motor strength equal bilaterally. Sensation normal to touch. Deep tendon reflexes 2+ and equal. Gait unsteady without support.  Mild intention tremor. Negative cerebellar signs.   PELVIC: External: Without lesions or inflammation. Vaginal: Atrophic changes, cuff intact. Bimanual: Non-tender, without masses. Rectovaginal: Confirms, heme-negative  stool x2.  CALVIN:  No masses.  Nontender.  Heme-negative stool x2.    ASSESSMENT:  1. Preventative health care    2. Dementia without behavioral disturbance, unspecified dementia type    3. Acquired hypothyroidism    4. CKD (chronic kidney disease), stage III    5. Mixed hyperlipidemia    6. Essential hypertension      PLAN:   1.   Increase caloric intake.  Increase activity.  2.  Age-appropriate counseling.  3.  Fasting lab.  4.  Family declines mammogram in 2018.  5.  Obtain influenza vaccine in fall 2018.  6.  Weigh patient at home and report further decline in weight.

## 2018-08-24 RX ORDER — LEVOTHYROXINE SODIUM 50 UG/1
TABLET ORAL
Qty: 90 TABLET | Refills: 3 | Status: SHIPPED | OUTPATIENT
Start: 2018-08-24

## 2018-08-27 RX ORDER — HYDROCHLOROTHIAZIDE 25 MG/1
25 TABLET ORAL EVERY MORNING
Qty: 90 TABLET | Refills: 3 | Status: SHIPPED | OUTPATIENT
Start: 2018-08-27 | End: 2019-04-27 | Stop reason: SDUPTHER

## 2018-08-27 NOTE — TELEPHONE ENCOUNTER
----- Message from Tisha Lieberman sent at 8/27/2018  4:02 PM CDT -----  Contact: pt  Pt stated she calling for a refill on BP medication, she can be reached at [phone] Thanks

## 2018-09-24 RX ORDER — DONEPEZIL HYDROCHLORIDE 10 MG/1
TABLET, FILM COATED ORAL
Qty: 90 TABLET | Refills: 3 | Status: SHIPPED | OUTPATIENT
Start: 2018-09-24

## 2018-12-19 RX ORDER — ATORVASTATIN CALCIUM 10 MG/1
10 TABLET, FILM COATED ORAL NIGHTLY
Qty: 90 TABLET | Refills: 2 | Status: SHIPPED | OUTPATIENT
Start: 2018-12-19

## 2018-12-19 NOTE — TELEPHONE ENCOUNTER
Called number provided. Grandson answered, stated the medication and said he was the one that called for his grandmother's Rx refill. Informed him that Rx was sent to pharmacy. Verbalized understanding.

## 2019-01-03 ENCOUNTER — IMMUNIZATION (OUTPATIENT)
Dept: PHARMACY | Facility: CLINIC | Age: 84
End: 2019-01-03
Payer: MEDICARE

## 2019-01-03 ENCOUNTER — OFFICE VISIT (OUTPATIENT)
Dept: OPHTHALMOLOGY | Facility: CLINIC | Age: 84
End: 2019-01-03
Payer: MEDICARE

## 2019-01-03 DIAGNOSIS — H25.11 NS (NUCLEAR SCLEROSIS), RIGHT: ICD-10-CM

## 2019-01-03 DIAGNOSIS — H52.4 BILATERAL PRESBYOPIA: ICD-10-CM

## 2019-01-03 DIAGNOSIS — Z96.1 PSEUDOPHAKIA, LEFT EYE: Primary | ICD-10-CM

## 2019-01-03 PROCEDURE — 92014 COMPRE OPH EXAM EST PT 1/>: CPT | Mod: HCNC,S$GLB,, | Performed by: OPTOMETRIST

## 2019-01-03 PROCEDURE — 99999 PR PBB SHADOW E&M-EST. PATIENT-LVL II: ICD-10-PCS | Mod: PBBFAC,HCNC,, | Performed by: OPTOMETRIST

## 2019-01-03 PROCEDURE — 99999 PR PBB SHADOW E&M-EST. PATIENT-LVL II: CPT | Mod: PBBFAC,HCNC,, | Performed by: OPTOMETRIST

## 2019-01-03 PROCEDURE — 92014 PR EYE EXAM, EST PATIENT,COMPREHESV: ICD-10-PCS | Mod: HCNC,S$GLB,, | Performed by: OPTOMETRIST

## 2019-01-03 NOTE — PROGRESS NOTES
"HPI     6 months Pseudo eye exam.  No visual complaints.  Last eye exam 07/10/2018 [facility].    Last edited by Arabella Fischer on 1/3/2019  2:08 PM. (History)            Assessment /Plan     For exam results, see Encounter Report.    Pseudophakia, left eye    NS (nuclear sclerosis), right    Bilateral presbyopia      Stable IOL OS  Defers cataract eval OD, "vision good"    RTC 1 year                 "

## 2019-01-31 ENCOUNTER — OFFICE VISIT (OUTPATIENT)
Dept: OPHTHALMOLOGY | Facility: CLINIC | Age: 84
End: 2019-01-31
Payer: MEDICARE

## 2019-01-31 DIAGNOSIS — H11.31 SUBCONJUNCTIVAL HEMORRHAGE, RIGHT: Primary | ICD-10-CM

## 2019-01-31 PROCEDURE — 92012 INTRM OPH EXAM EST PATIENT: CPT | Mod: HCNC,S$GLB,, | Performed by: OPHTHALMOLOGY

## 2019-01-31 PROCEDURE — 99999 PR PBB SHADOW E&M-EST. PATIENT-LVL II: ICD-10-PCS | Mod: PBBFAC,HCNC,, | Performed by: OPHTHALMOLOGY

## 2019-01-31 PROCEDURE — 99999 PR PBB SHADOW E&M-EST. PATIENT-LVL II: CPT | Mod: PBBFAC,HCNC,, | Performed by: OPHTHALMOLOGY

## 2019-01-31 PROCEDURE — 92012 PR EYE EXAM, EST PATIENT,INTERMED: ICD-10-PCS | Mod: HCNC,S$GLB,, | Performed by: OPHTHALMOLOGY

## 2019-01-31 NOTE — PROGRESS NOTES
SUBJECTIVE:   Kota Arechiga is a 87 y.o. female   Uncorrected distance visual acuity was 20/50 +1 in the right eye and 20/40 in the left eye.   Chief Complaint   Patient presents with    Eye Problem     Right Eye Blood Shot Red         HPI:  HPI     Eye Problem      Additional comments: Right Eye Blood Shot Red               Comments     Patient States 2 Weeks Ago Notice Right Eye Blood Shot Red, Itchy, &   Watery.    Cataract OD  PCIOL OS +20.5 SN60WF (distance) 6-6-18 [facility]                Last edited by Laurel Cheng on 1/31/2019  1:52 PM. (History)        Assessment /Plan :  1. Subconjunctival hemorrhage, right Educated pt in full detail on diagnosis,pt understood instructed pt to return if OD does not improve over the next 2 months          RTC as scheduled

## 2019-03-20 ENCOUNTER — TELEPHONE (OUTPATIENT)
Dept: FAMILY MEDICINE | Facility: CLINIC | Age: 84
End: 2019-03-20

## 2019-03-20 NOTE — TELEPHONE ENCOUNTER
----- Message from Uri Tineo sent at 3/20/2019 11:46 AM CDT -----  ..Type:  Patient Returning Call    Who Called:Sara([facility])  Who Left Message for Patient:  Does the patient know what this is regarding?:  Would the patient rather a call back or a response via [facility]? Call back   Best Call Back Number: [phone] ( office ) or [phone]  Additional Information: Sara([facility]) is requesting wilfredo graphic sheet on the pt with insurance information and History and physical

## 2019-03-20 NOTE — TELEPHONE ENCOUNTER
----- Message from Tej Vazquez sent at 3/20/2019  3:11 PM CDT -----  Contact: Pt/Son (Mil)  Caller states that he needs this to be sent to a nursing facility (Demographic Sheet and HNP) Fax to [phone] and if they have any questions contact Sara at [phone] ([facility]) Please give pt son a call at ..[phone]
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
## 2019-03-20 NOTE — TELEPHONE ENCOUNTER
Attempted to reach Saar at [facility] to get all information that is needed no answer left message.

## 2019-03-22 ENCOUNTER — TELEPHONE (OUTPATIENT)
Dept: FAMILY MEDICINE | Facility: CLINIC | Age: 84
End: 2019-03-22

## 2019-03-22 ENCOUNTER — TELEPHONE (OUTPATIENT)
Dept: OPHTHALMOLOGY | Facility: CLINIC | Age: 84
End: 2019-03-22
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
## 2019-04-03 ENCOUNTER — OFFICE VISIT (OUTPATIENT)
Dept: FAMILY MEDICINE | Facility: CLINIC | Age: 84
End: 2019-04-03
Payer: MEDICARE

## 2019-04-03 VITALS
OXYGEN SATURATION: 99 % | WEIGHT: 88.63 LBS | HEIGHT: 60 IN | TEMPERATURE: 98 F | BODY MASS INDEX: 17.4 KG/M2 | SYSTOLIC BLOOD PRESSURE: 148 MMHG | HEART RATE: 72 BPM | DIASTOLIC BLOOD PRESSURE: 84 MMHG

## 2019-04-03 DIAGNOSIS — K59.00 CONSTIPATION, UNSPECIFIED CONSTIPATION TYPE: ICD-10-CM

## 2019-04-03 DIAGNOSIS — G47.00 INSOMNIA, UNSPECIFIED TYPE: Primary | ICD-10-CM

## 2019-04-03 DIAGNOSIS — F03.91 DEMENTIA WITH BEHAVIORAL DISTURBANCE, UNSPECIFIED DEMENTIA TYPE: ICD-10-CM

## 2019-04-03 PROCEDURE — 1100F PTFALLS ASSESS-DOCD GE2>/YR: CPT | Mod: HCNC,CPTII,S$GLB, | Performed by: REGISTERED NURSE

## 2019-04-03 PROCEDURE — 99214 OFFICE O/P EST MOD 30 MIN: CPT | Mod: HCNC,S$GLB,, | Performed by: REGISTERED NURSE

## 2019-04-03 PROCEDURE — 3288F PR FALLS RISK ASSESSMENT DOCUMENTED: ICD-10-PCS | Mod: HCNC,CPTII,S$GLB, | Performed by: REGISTERED NURSE

## 2019-04-03 PROCEDURE — 1100F PR PT FALLS ASSESS DOC 2+ FALLS/FALL W/INJURY/YR: ICD-10-PCS | Mod: HCNC,CPTII,S$GLB, | Performed by: REGISTERED NURSE

## 2019-04-03 PROCEDURE — 99999 PR PBB SHADOW E&M-EST. PATIENT-LVL IV: CPT | Mod: PBBFAC,HCNC,, | Performed by: REGISTERED NURSE

## 2019-04-03 PROCEDURE — 99214 PR OFFICE/OUTPT VISIT, EST, LEVL IV, 30-39 MIN: ICD-10-PCS | Mod: HCNC,S$GLB,, | Performed by: REGISTERED NURSE

## 2019-04-03 PROCEDURE — 3288F FALL RISK ASSESSMENT DOCD: CPT | Mod: HCNC,CPTII,S$GLB, | Performed by: REGISTERED NURSE

## 2019-04-03 PROCEDURE — 99999 PR PBB SHADOW E&M-EST. PATIENT-LVL IV: ICD-10-PCS | Mod: PBBFAC,HCNC,, | Performed by: REGISTERED NURSE

## 2019-04-03 RX ORDER — LACTULOSE 10 G/15ML
SOLUTION ORAL; RECTAL
Qty: 473 ML | Refills: 0 | Status: SHIPPED | OUTPATIENT
Start: 2019-04-03

## 2019-04-03 RX ORDER — TRAZODONE HYDROCHLORIDE 50 MG/1
25 TABLET ORAL NIGHTLY PRN
Qty: 15 TABLET | Refills: 2 | Status: SHIPPED | OUTPATIENT
Start: 2019-04-03 | End: 2019-04-27 | Stop reason: SDUPTHER
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
## 2019-04-15 DIAGNOSIS — K59.00 CONSTIPATION, UNSPECIFIED CONSTIPATION TYPE: ICD-10-CM

## 2019-04-15 RX ORDER — LACTULOSE 10 G/15ML
SOLUTION ORAL; RECTAL
Qty: 473 ML | Refills: 0 | OUTPATIENT
Start: 2019-04-15

## 2019-04-17 ENCOUNTER — TELEPHONE (OUTPATIENT)
Dept: FAMILY MEDICINE | Facility: CLINIC | Age: 84
End: 2019-04-17

## 2019-04-17 NOTE — TELEPHONE ENCOUNTER
----- Message from Sallie Ragsdale sent at 4/17/2019  2:26 PM CDT -----  Contact: Erickson/son  Erickson called to speak with the nurse; he needs a letter to bring to [employer] verifying that the patient does have Dementia and that she is not capable of handling money. He stated that he needs to have it to them by Monday so he would like to come and pick it up. He can be contacted at [phone].    Thanks,  Sallie

## 2019-04-22 ENCOUNTER — TELEPHONE (OUTPATIENT)
Dept: FAMILY MEDICINE | Facility: CLINIC | Age: 84
End: 2019-04-22

## 2019-04-22 NOTE — TELEPHONE ENCOUNTER
----- Message from Phuong Fischer sent at 4/22/2019 10:45 AM CDT -----  Contact: Erickson - son  States a letter is needed verifying the pt has dementia, no additional info given and can be reached at [phone]///[id number]

## 2019-04-27 DIAGNOSIS — G47.00 INSOMNIA, UNSPECIFIED TYPE: ICD-10-CM

## 2019-04-28 RX ORDER — HYDROCHLOROTHIAZIDE 25 MG/1
TABLET ORAL
Qty: 90 TABLET | Refills: 0 | Status: SHIPPED | OUTPATIENT
Start: 2019-04-28

## 2019-04-29 RX ORDER — TRAZODONE HYDROCHLORIDE 50 MG/1
TABLET ORAL
Qty: 45 TABLET | Refills: 1 | Status: SHIPPED | OUTPATIENT
Start: 2019-04-29

## 2019-04-30 ENCOUNTER — TELEPHONE (OUTPATIENT)
Dept: FAMILY MEDICINE | Facility: CLINIC | Age: 84
End: 2019-04-30

## 2019-04-30 DIAGNOSIS — I10 ESSENTIAL HYPERTENSION: Primary | ICD-10-CM

## 2019-04-30 NOTE — TELEPHONE ENCOUNTER
Last annual: 8/7/18  Patient's grandson is requesting xray orders be put in for the patient, without having to be seen by the doctor; required for admission to the [facility]. /EMMA    ----- Message from Talya Avalos sent at 4/30/2019  3:28 PM CDT -----  Contact: Mil Blanco/g son [phone]  States that he is calling to get an order for a chest xray for pt for nursing home placement. Please call back at [phone]//thank you acc

## 2019-04-30 NOTE — TELEPHONE ENCOUNTER
Is he sure that this is all that is required for her to go into the nursing home?  There is usually other things that need to be done.

## 2019-05-01 ENCOUNTER — HOSPITAL ENCOUNTER (OUTPATIENT)
Dept: RADIOLOGY | Facility: HOSPITAL | Age: 84
Discharge: HOME | End: 2019-05-01
Attending: FAMILY MEDICINE
Payer: MEDICARE

## 2019-05-01 DIAGNOSIS — I10 ESSENTIAL HYPERTENSION: ICD-10-CM

## 2019-05-01 PROCEDURE — 71046 X-RAY EXAM CHEST 2 VIEWS: CPT | Mod: 26,HCNC,, | Performed by: RADIOLOGY

## 2019-05-01 PROCEDURE — 71046 X-RAY EXAM CHEST 2 VIEWS: CPT | Mod: TC,HCNC

## 2019-05-01 PROCEDURE — 71046 XR CHEST PA AND LATERAL: ICD-10-PCS | Mod: 26,HCNC,, | Performed by: RADIOLOGY

## 2019-05-30 ENCOUNTER — TELEPHONE (OUTPATIENT)
Dept: FAMILY MEDICINE | Facility: CLINIC | Age: 84
End: 2019-05-30

## 2019-05-30 NOTE — TELEPHONE ENCOUNTER
----- Message from Josefina Pineda sent at 5/30/2019  1:57 PM CDT -----  Contact: audie/shashank  Caller needs call back rg status of [phone] paperwork [phone] ext [phone]

## 2020-10-06 ENCOUNTER — PATIENT MESSAGE (OUTPATIENT)
Dept: ADMINISTRATIVE | Facility: HOSPITAL | Age: 85
End: 2020-10-06